# Patient Record
Sex: FEMALE | Race: WHITE | NOT HISPANIC OR LATINO | Employment: OTHER | URBAN - METROPOLITAN AREA
[De-identification: names, ages, dates, MRNs, and addresses within clinical notes are randomized per-mention and may not be internally consistent; named-entity substitution may affect disease eponyms.]

---

## 2024-08-14 ENCOUNTER — TRANSCRIBE ORDERS (OUTPATIENT)
Dept: ADMINISTRATIVE | Facility: HOSPITAL | Age: 74
End: 2024-08-14
Payer: MEDICARE

## 2024-08-14 DIAGNOSIS — R10.9 ABDOMINAL PAIN, UNSPECIFIED ABDOMINAL LOCATION: Primary | ICD-10-CM

## 2024-08-15 ENCOUNTER — HOSPITAL ENCOUNTER (OUTPATIENT)
Dept: CT IMAGING | Facility: HOSPITAL | Age: 74
Discharge: HOME | End: 2024-08-15
Admitting: FAMILY MEDICINE
Payer: MEDICARE

## 2024-08-15 DIAGNOSIS — R10.9 ABDOMINAL PAIN, UNSPECIFIED ABDOMINAL LOCATION: ICD-10-CM

## 2024-08-15 PROCEDURE — 74176 CT ABD & PELVIS W/O CONTRAST: CPT

## 2024-08-16 ENCOUNTER — TRANSCRIBE ORDERS (OUTPATIENT)
Dept: ADMINISTRATIVE | Facility: HOSPITAL | Age: 74
End: 2024-08-16
Payer: MEDICARE

## 2024-08-16 DIAGNOSIS — N28.9 RENAL LESION: Primary | ICD-10-CM

## 2024-08-16 DIAGNOSIS — R93.89 ABNORMAL CT SCAN: ICD-10-CM

## 2024-08-19 ENCOUNTER — TRANSCRIBE ORDERS (OUTPATIENT)
Dept: ADMINISTRATIVE | Facility: HOSPITAL | Age: 74
End: 2024-08-19
Payer: MEDICARE

## 2024-08-19 DIAGNOSIS — N28.9 RENAL LESION: Primary | ICD-10-CM

## 2024-08-22 ENCOUNTER — TRANSCRIBE ORDERS (OUTPATIENT)
Dept: ADMINISTRATIVE | Facility: HOSPITAL | Age: 74
End: 2024-08-22
Payer: MEDICARE

## 2024-08-22 DIAGNOSIS — R93.89 ABNORMAL CT SCAN: Primary | ICD-10-CM

## 2024-08-26 ENCOUNTER — OFFICE VISIT (OUTPATIENT)
Dept: CARDIOLOGY | Facility: CLINIC | Age: 74
End: 2024-08-26
Payer: MEDICARE

## 2024-08-26 VITALS
DIASTOLIC BLOOD PRESSURE: 72 MMHG | HEIGHT: 65 IN | SYSTOLIC BLOOD PRESSURE: 118 MMHG | BODY MASS INDEX: 27.56 KG/M2 | WEIGHT: 165.4 LBS | OXYGEN SATURATION: 98 % | HEART RATE: 57 BPM | RESPIRATION RATE: 17 BRPM

## 2024-08-26 DIAGNOSIS — R01.1 HEART MURMUR: Primary | ICD-10-CM

## 2024-08-26 PROCEDURE — 99204 OFFICE O/P NEW MOD 45 MIN: CPT | Performed by: INTERNAL MEDICINE

## 2024-08-26 RX ORDER — GLIMEPIRIDE 4 MG/1
1.5 TABLET ORAL 2 TIMES DAILY
COMMUNITY
Start: 2024-05-06

## 2024-08-26 RX ORDER — SEMAGLUTIDE 0.68 MG/ML
INJECTION, SOLUTION SUBCUTANEOUS
COMMUNITY
Start: 2024-08-20

## 2024-08-26 RX ORDER — CARVEDILOL 25 MG/1
25 TABLET ORAL EVERY 12 HOURS
COMMUNITY
Start: 2024-07-24

## 2024-08-26 RX ORDER — GABAPENTIN 400 MG/1
400 CAPSULE ORAL 4 TIMES DAILY
COMMUNITY
Start: 2024-05-06

## 2024-08-26 RX ORDER — ANASTROZOLE 1 MG/1
1 TABLET ORAL DAILY
COMMUNITY
Start: 2024-05-06

## 2024-08-26 RX ORDER — MECLIZINE HYDROCHLORIDE 25 MG/1
25 TABLET ORAL 3 TIMES DAILY PRN
COMMUNITY
Start: 2024-08-09

## 2024-08-26 RX ORDER — LOVASTATIN 20 MG
1 TABLET ORAL DAILY
COMMUNITY
Start: 2024-05-06

## 2024-08-26 RX ORDER — ASPIRIN 81 MG/81MG
1 CAPSULE ORAL DAILY
COMMUNITY

## 2024-08-26 RX ORDER — ALENDRONATE SODIUM 70 MG/1
TABLET ORAL
COMMUNITY
Start: 2024-05-06

## 2024-08-26 RX ORDER — HYDRALAZINE HYDROCHLORIDE 100 MG/1
1 TABLET, FILM COATED ORAL 2 TIMES DAILY
COMMUNITY
Start: 2024-08-09

## 2024-08-26 RX ORDER — HYDROCODONE BITARTRATE AND ACETAMINOPHEN 5; 325 MG/1; MG/1
1 TABLET ORAL EVERY 12 HOURS PRN
COMMUNITY
Start: 2024-08-09

## 2024-08-26 RX ORDER — LORAZEPAM 2 MG/1
2 TABLET ORAL
COMMUNITY

## 2024-08-26 RX ORDER — FAMOTIDINE 20 MG/1
1 TABLET, FILM COATED ORAL 2 TIMES DAILY
COMMUNITY
Start: 2024-05-06

## 2024-08-26 RX ORDER — SERTRALINE HYDROCHLORIDE 25 MG/1
25 TABLET, FILM COATED ORAL DAILY
COMMUNITY

## 2024-08-26 NOTE — PROGRESS NOTES
"     [facility] Cardiology OP Consult Note    Catrachito Schuler  [id number]  2024    Referred By: Keysha Guerra DO    Chief Complaint: Murmur    History of Present Illness:   Mrs. Catrachito Schuler is a 73 y.o. female who presents to the Cardiology Clinic for evaluation of a cardiac murmur.  The patient reports a past cardiac history for a \"leaky valve.\"  This was previously followed by cardiologist several years ago.  More recently, she underwent CT imaging of the abdomen, with an incidental finding of possible cardiomegaly.  She was subsequently referred to cardiology for further evaluation.  Currently, the patient does not have any specific cardiac complaints.  She denies significant exertional dyspnea.  No episodes of chest pain or chest discomfort.  No significant palpitations.  No other specific complaints    Past Cardiac Testin. Last Coronary Angio: None  2. Prior Stress Testing: None  3. Last Echo: Records unavailable  4. Prior Holter Monitor: None    Review of Systems:   Review of Systems   Constitutional:  Negative for activity change, appetite change, chills, diaphoresis, fatigue, fever, unexpected weight gain and unexpected weight loss.   Eyes:  Negative for blurred vision and double vision.   Respiratory:  Negative for cough, chest tightness, shortness of breath and wheezing.    Cardiovascular:  Negative for chest pain, palpitations and leg swelling.   Gastrointestinal:  Negative for abdominal pain, anal bleeding, blood in stool and GERD.   Endocrine: Negative for cold intolerance and heat intolerance.   Genitourinary:  Negative for hematuria.   Neurological:  Negative for dizziness, syncope, weakness and light-headedness.   Hematological:  Does not bruise/bleed easily.   Psychiatric/Behavioral:  Negative for depressed mood and stress. The patient is not nervous/anxious.        Past Medical History:   Past Medical History:   Diagnosis Date    Cancer     Diabetes " mellitus     GERD (gastroesophageal reflux disease)     Hyperlipidemia     Hypertension     Kidney disease     Murmur     Myocardial infarction        Past Surgical History:   Past Surgical History:   Procedure Laterality Date    HYSTERECTOMY      MASTECTOMY, PARTIAL         Family History: No family history on file.    Social History:   Social History     Socioeconomic History    Marital status:    Tobacco Use    Smoking status: Never    Smokeless tobacco: Never   Substance and Sexual Activity    Alcohol use: Never       Medications:     Current Outpatient Medications:     alendronate (FOSAMAX) 70 MG tablet, TAKE 1 TABLET BY MOUTH EVERY WEEK. TAKE WITH 6-8 OUNCES OF PLAIN WATER AT LEAST 30 MINUTES BEFORE FIRST FOOD, BEVERAGE, OR MEDICATION OF THE DAY, Disp: , Rfl:     anastrozole (ARIMIDEX) 1 MG tablet, Take 1 tablet by mouth Daily., Disp: , Rfl:     Aspirin ([place]) 81 MG capsule, Take 1 capsule by mouth Daily., Disp: , Rfl:     carvedilol (COREG) 25 MG tablet, Take 1 tablet by mouth Every 12 (Twelve) Hours., Disp: , Rfl:     famotidine (PEPCID) 20 MG tablet, Take 1 tablet by mouth 2 (Two) Times a Day., Disp: , Rfl:     gabapentin (NEURONTIN) 400 MG capsule, Take 1 capsule by mouth 4 (Four) Times a Day., Disp: , Rfl:     glimepiride (AMARYL) 4 MG tablet, Take 1.5 tablets by mouth 2 (Two) Times a Day., Disp: , Rfl:     hydrALAZINE (APRESOLINE) 100 MG tablet, Take 1 tablet by mouth 2 (Two) Times a Day., Disp: , Rfl:     HYDROcodone-acetaminophen (NORCO) 5-325 MG per tablet, Take 1 tablet by mouth Every 12 (Twelve) Hours As Needed., Disp: , Rfl:     LORazepam (ATIVAN) 2 MG tablet, Take 1 tablet by mouth every night at bedtime., Disp: , Rfl:     lovastatin (MEVACOR) 20 MG tablet, Take 1 tablet by mouth Daily., Disp: , Rfl:     meclizine (ANTIVERT) 25 MG tablet, Take 1 tablet by mouth 3 (Three) Times a Day As Needed., Disp: , Rfl:     metFORMIN (GLUCOPHAGE) 500 MG tablet, Take 2 tablets by mouth 2 (Two) Times  "a Day With Meals., Disp: , Rfl:     Ozempic, 0.25 or 0.5 MG/DOSE, 2 MG/3ML solution pen-injector, INJECT 0.25MG UNDER THE SKIN ONE TIME WEEKLY SAME DAY EACH WEEK, Disp: , Rfl:     sertraline (ZOLOFT) 25 MG tablet, Take 1 tablet by mouth Daily., Disp: , Rfl:     Allergies:   No Known Allergies    Physical Exam:  Vital Signs:   Vitals:    08/26/24 1348   BP: 118/72   BP Location: Right arm   Patient Position: Sitting   Cuff Size: Adult   Pulse: 57   Resp: 17   SpO2: 98%   Weight: 75 kg (165 lb 6.4 oz)   Height: 165.1 cm (65\")       Physical Exam  Constitutional:       General: She is not in acute distress.     Appearance: Normal appearance. She is well-developed. She is not diaphoretic.   HENT:      Head: Normocephalic and atraumatic.   Eyes:      General: No scleral icterus.     Pupils: Pupils are equal, round, and reactive to light.   Neck:      Trachea: No tracheal deviation.   Cardiovascular:      Rate and Rhythm: Normal rate and regular rhythm.      Heart sounds: Murmur heard.      No friction rub. No gallop.      Comments: Normal JVD.    Pulmonary:      Effort: Pulmonary effort is normal. No respiratory distress.      Breath sounds: Normal breath sounds. No stridor. No wheezing or rales.   Chest:      Chest wall: No tenderness.   Abdominal:      General: Bowel sounds are normal. There is no distension.      Palpations: Abdomen is soft.      Tenderness: There is no abdominal tenderness. There is no guarding or rebound.   Musculoskeletal:         General: No swelling. Normal range of motion.      Cervical back: Neck supple. No tenderness.   Lymphadenopathy:      Cervical: No cervical adenopathy.   Skin:     General: Skin is warm and dry.      Findings: No erythema.   Neurological:      General: No focal deficit present.      Mental Status: She is alert and oriented to person, place, and time.   Psychiatric:         Mood and Affect: Mood normal.         Behavior: Behavior normal.         Results Review:   I " reviewed the patient's new clinical results.        Assessment / Plan:     1.  Cardiac murmur  --Soft murmur on exam today, suspicious for aortic stenosis  -- Will obtain echocardiogram to further evaluate  -- Follow-up as needed, pending results of echocardiogram    2.  Possible cardiomegaly  -- Noted on recent CT imaging  -- Echocardiogram, as above          Follow Up:   Return if symptoms worsen or fail to improve.      Thank you for allowing me to participate in the care of your patient. Please to not hesitate to contact me with additional questions or concerns.     TONY Pham MD  Interventional Cardiology   08/26/2024  13:51 EDT

## 2024-09-04 ENCOUNTER — HOSPITAL ENCOUNTER (OUTPATIENT)
Facility: HOSPITAL | Age: 74
Discharge: HOME | End: 2024-09-04
Admitting: FAMILY MEDICINE
Payer: MEDICARE

## 2024-09-04 DIAGNOSIS — N28.9 RENAL LESION: ICD-10-CM

## 2024-09-04 PROCEDURE — 74181 MRI ABDOMEN W/O CONTRAST: CPT

## 2024-12-31 ENCOUNTER — APPOINTMENT (OUTPATIENT)
Dept: GENERAL RADIOLOGY | Facility: HOSPITAL | Age: 74
End: 2024-12-31
Payer: MEDICARE

## 2024-12-31 ENCOUNTER — HOSPITAL ENCOUNTER (OUTPATIENT)
Facility: HOSPITAL | Age: 74
Discharge: HOME | End: 2025-01-01
Attending: EMERGENCY MEDICINE | Admitting: INTERNAL MEDICINE
Payer: MEDICARE

## 2024-12-31 ENCOUNTER — APPOINTMENT (OUTPATIENT)
Dept: CT IMAGING | Facility: HOSPITAL | Age: 74
End: 2024-12-31
Payer: MEDICARE

## 2024-12-31 ENCOUNTER — APPOINTMENT (OUTPATIENT)
Dept: CARDIOLOGY | Facility: HOSPITAL | Age: 74
End: 2024-12-31
Payer: MEDICARE

## 2024-12-31 DIAGNOSIS — R07.9 CHEST PAIN, UNSPECIFIED TYPE: Primary | ICD-10-CM

## 2024-12-31 PROBLEM — R07.89 OTHER CHEST PAIN: Status: ACTIVE | Noted: 2024-12-31

## 2024-12-31 LAB
ALBUMIN SERPL-MCNC: 4.4 G/DL (ref 3.5–5.2)
ALBUMIN/GLOB SERPL: 1.3 G/DL
ALP SERPL-CCNC: 98 U/L (ref 39–117)
ALT SERPL W P-5'-P-CCNC: 17 U/L (ref 1–33)
ANION GAP SERPL CALCULATED.3IONS-SCNC: 10 MMOL/L (ref 5–15)
AST SERPL-CCNC: 22 U/L (ref 1–32)
AV MEAN PRESS GRAD SYS DOP V1V2: 16.5 MMHG
AV VMAX SYS DOP: 296 CM/SEC
BASOPHILS # BLD AUTO: 0.04 10*3/MM3 (ref 0–0.2)
BASOPHILS NFR BLD AUTO: 1.4 % (ref 0–1.5)
BH CV ECHO MEAS - AO MAX PG: 35.1 MMHG
BH CV ECHO MEAS - AO ROOT DIAM: 3 CM
BH CV ECHO MEAS - AO V2 VTI: 60 CM
BH CV ECHO MEAS - AVA(I,D): 1.34 CM2
BH CV ECHO MEAS - EDV(CUBED): 84.6 ML
BH CV ECHO MEAS - EDV(MOD-SP2): 80.7 ML
BH CV ECHO MEAS - EDV(MOD-SP4): 113 ML
BH CV ECHO MEAS - EF(MOD-SP2): 55.6 %
BH CV ECHO MEAS - EF(MOD-SP4): 68.2 %
BH CV ECHO MEAS - ESV(CUBED): 27.8 ML
BH CV ECHO MEAS - ESV(MOD-SP2): 35.8 ML
BH CV ECHO MEAS - ESV(MOD-SP4): 35.9 ML
BH CV ECHO MEAS - FS: 31 %
BH CV ECHO MEAS - IVS/LVPW: 1.12 CM
BH CV ECHO MEAS - IVSD: 1.27 CM
BH CV ECHO MEAS - LA DIMENSION: 5.1 CM
BH CV ECHO MEAS - LAT PEAK E' VEL: 5.7 CM/SEC
BH CV ECHO MEAS - LV DIASTOLIC VOL/BSA (35-75): 63.9 CM2
BH CV ECHO MEAS - LV MASS(C)D: 190.7 GRAMS
BH CV ECHO MEAS - LV MAX PG: 7.3 MMHG
BH CV ECHO MEAS - LV MEAN PG: 3 MMHG
BH CV ECHO MEAS - LV SYSTOLIC VOL/BSA (12-30): 20.3 CM2
BH CV ECHO MEAS - LV V1 MAX: 135 CM/SEC
BH CV ECHO MEAS - LV V1 VTI: 27.4 CM
BH CV ECHO MEAS - LVIDD: 4.4 CM
BH CV ECHO MEAS - LVIDS: 3 CM
BH CV ECHO MEAS - LVOT AREA: 2.9 CM2
BH CV ECHO MEAS - LVOT DIAM: 1.93 CM
BH CV ECHO MEAS - LVPWD: 1.13 CM
BH CV ECHO MEAS - MED PEAK E' VEL: 3.8 CM/SEC
BH CV ECHO MEAS - MV A MAX VEL: 105 CM/SEC
BH CV ECHO MEAS - MV DEC SLOPE: 235 CM/SEC2
BH CV ECHO MEAS - MV DEC TIME: 0.27 SEC
BH CV ECHO MEAS - MV E MAX VEL: 62.7 CM/SEC
BH CV ECHO MEAS - MV E/A: 0.6
BH CV ECHO MEAS - MV MAX PG: 5 MMHG
BH CV ECHO MEAS - MV MEAN PG: 2 MMHG
BH CV ECHO MEAS - MV V2 VTI: 31 CM
BH CV ECHO MEAS - MVA(VTI): 2.6 CM2
BH CV ECHO MEAS - PA ACC TIME: 0.08 SEC
BH CV ECHO MEAS - PA V2 MAX: 120 CM/SEC
BH CV ECHO MEAS - RAP SYSTOLE: 3 MMHG
BH CV ECHO MEAS - RV MAX PG: 4.2 MMHG
BH CV ECHO MEAS - RV V1 MAX: 103 CM/SEC
BH CV ECHO MEAS - RV V1 VTI: 14.2 CM
BH CV ECHO MEAS - SV(LVOT): 80.2 ML
BH CV ECHO MEAS - SV(MOD-SP2): 44.9 ML
BH CV ECHO MEAS - SV(MOD-SP4): 77.1 ML
BH CV ECHO MEAS - SVI(LVOT): 45.3 ML/M2
BH CV ECHO MEAS - SVI(MOD-SP2): 25.4 ML/M2
BH CV ECHO MEAS - SVI(MOD-SP4): 43.6 ML/M2
BH CV ECHO MEAS - TAPSE (>1.6): 2.7 CM
BH CV ECHO MEASUREMENTS AVERAGE E/E' RATIO: 13.2
BH CV XLRA - RV BASE: 3.5 CM
BH CV XLRA - RV LENGTH: 7.9 CM
BH CV XLRA - RV MID: 2.7 CM
BH CV XLRA - TDI S': 12.2 CM/SEC
BILIRUB SERPL-MCNC: 0.2 MG/DL (ref 0–1.2)
BUN SERPL-MCNC: 21 MG/DL (ref 8–23)
BUN/CREAT SERPL: 19.4 (ref 7–25)
CALCIUM SPEC-SCNC: 9.9 MG/DL (ref 8.6–10.5)
CHLORIDE SERPL-SCNC: 104 MMOL/L (ref 98–107)
CO2 SERPL-SCNC: 25 MMOL/L (ref 22–29)
CREAT SERPL-MCNC: 1.08 MG/DL (ref 0.57–1)
DEPRECATED RDW RBC AUTO: 48.3 FL (ref 37–54)
EGFRCR SERPLBLD CKD-EPI 2021: 53.3 ML/MIN/1.73
EOSINOPHIL # BLD AUTO: 0.22 10*3/MM3 (ref 0–0.4)
EOSINOPHIL NFR BLD AUTO: 7.5 % (ref 0.3–6.2)
ERYTHROCYTE [DISTWIDTH] IN BLOOD BY AUTOMATED COUNT: 14.1 % (ref 12.3–15.4)
GEN 5 1HR TROPONIN T REFLEX: 16 NG/L
GLOBULIN UR ELPH-MCNC: 3.4 GM/DL
GLUCOSE BLDC GLUCOMTR-MCNC: 117 MG/DL (ref 70–130)
GLUCOSE BLDC GLUCOMTR-MCNC: 148 MG/DL (ref 70–130)
GLUCOSE SERPL-MCNC: 197 MG/DL (ref 65–99)
HCT VFR BLD AUTO: 37.5 % (ref 34–46.6)
HGB BLD-MCNC: 12.4 G/DL (ref 12–15.9)
HOLD SPECIMEN: NORMAL
HOLD SPECIMEN: NORMAL
IMM GRANULOCYTES # BLD AUTO: 0.04 10*3/MM3 (ref 0–0.05)
IMM GRANULOCYTES NFR BLD AUTO: 1.4 % (ref 0–0.5)
LEFT ATRIUM VOLUME INDEX: 34.8 ML/M2
LV EF BIPLANE MOD: 63.5 %
LYMPHOCYTES # BLD AUTO: 1.16 10*3/MM3 (ref 0.7–3.1)
LYMPHOCYTES NFR BLD AUTO: 39.7 % (ref 19.6–45.3)
MCH RBC QN AUTO: 30.6 PG (ref 26.6–33)
MCHC RBC AUTO-ENTMCNC: 33.1 G/DL (ref 31.5–35.7)
MCV RBC AUTO: 92.6 FL (ref 79–97)
MONOCYTES # BLD AUTO: 0.26 10*3/MM3 (ref 0.1–0.9)
MONOCYTES NFR BLD AUTO: 8.9 % (ref 5–12)
NEUTROPHILS NFR BLD AUTO: 1.2 10*3/MM3 (ref 1.7–7)
NEUTROPHILS NFR BLD AUTO: 41.1 % (ref 42.7–76)
NRBC BLD AUTO-RTO: 0 /100 WBC (ref 0–0.2)
PLATELET # BLD AUTO: 203 10*3/MM3 (ref 140–450)
PMV BLD AUTO: 10.3 FL (ref 6–12)
POTASSIUM SERPL-SCNC: 4.1 MMOL/L (ref 3.5–5.2)
PROCALCITONIN SERPL-MCNC: 0.04 NG/ML (ref 0–0.25)
PROT SERPL-MCNC: 7.8 G/DL (ref 6–8.5)
RBC # BLD AUTO: 4.05 10*6/MM3 (ref 3.77–5.28)
SODIUM SERPL-SCNC: 139 MMOL/L (ref 136–145)
TROPONIN T % DELTA: 0 %
TROPONIN T NUMERIC DELTA: 0 NG/L
TROPONIN T SERPL HS-MCNC: 16 NG/L
WBC NRBC COR # BLD AUTO: 2.92 10*3/MM3 (ref 3.4–10.8)
WHOLE BLOOD HOLD COAG: NORMAL
WHOLE BLOOD HOLD SPECIMEN: NORMAL

## 2024-12-31 PROCEDURE — A9270 NON-COVERED ITEM OR SERVICE: HCPCS | Performed by: INTERNAL MEDICINE

## 2024-12-31 PROCEDURE — 71275 CT ANGIOGRAPHY CHEST: CPT

## 2024-12-31 PROCEDURE — 85025 COMPLETE CBC W/AUTO DIFF WBC: CPT | Performed by: EMERGENCY MEDICINE

## 2024-12-31 PROCEDURE — 25810000003 SODIUM CHLORIDE 0.9 % SOLUTION: Performed by: NURSE PRACTITIONER

## 2024-12-31 PROCEDURE — 25510000001 IOPAMIDOL PER 1 ML: Performed by: INTERNAL MEDICINE

## 2024-12-31 PROCEDURE — 63710000001 GABAPENTIN 400 MG CAPSULE: Performed by: INTERNAL MEDICINE

## 2024-12-31 PROCEDURE — 25010000002 HEPARIN (PORCINE) PER 1000 UNITS: Performed by: INTERNAL MEDICINE

## 2024-12-31 PROCEDURE — 63710000001 AMLODIPINE 5 MG TABLET: Performed by: INTERNAL MEDICINE

## 2024-12-31 PROCEDURE — C1769 GUIDE WIRE: HCPCS | Performed by: INTERNAL MEDICINE

## 2024-12-31 PROCEDURE — 25010000002 FENTANYL CITRATE (PF) 50 MCG/ML SOLUTION: Performed by: INTERNAL MEDICINE

## 2024-12-31 PROCEDURE — G0378 HOSPITAL OBSERVATION PER HR: HCPCS

## 2024-12-31 PROCEDURE — 63710000001 FAMOTIDINE 20 MG TABLET: Performed by: INTERNAL MEDICINE

## 2024-12-31 PROCEDURE — 84145 PROCALCITONIN (PCT): CPT | Performed by: NURSE PRACTITIONER

## 2024-12-31 PROCEDURE — 93306 TTE W/DOPPLER COMPLETE: CPT | Performed by: INTERNAL MEDICINE

## 2024-12-31 PROCEDURE — 93454 CORONARY ARTERY ANGIO S&I: CPT | Performed by: INTERNAL MEDICINE

## 2024-12-31 PROCEDURE — 63710000001 NITROGLYCERIN 0.4 MG SUBLINGUAL TABLET: Performed by: INTERNAL MEDICINE

## 2024-12-31 PROCEDURE — C1894 INTRO/SHEATH, NON-LASER: HCPCS | Performed by: INTERNAL MEDICINE

## 2024-12-31 PROCEDURE — 99285 EMERGENCY DEPT VISIT HI MDM: CPT | Performed by: EMERGENCY MEDICINE

## 2024-12-31 PROCEDURE — 84484 ASSAY OF TROPONIN QUANT: CPT | Performed by: EMERGENCY MEDICINE

## 2024-12-31 PROCEDURE — C1760 CLOSURE DEV, VASC: HCPCS | Performed by: INTERNAL MEDICINE

## 2024-12-31 PROCEDURE — 93005 ELECTROCARDIOGRAM TRACING: CPT | Performed by: EMERGENCY MEDICINE

## 2024-12-31 PROCEDURE — 99204 OFFICE O/P NEW MOD 45 MIN: CPT | Performed by: INTERNAL MEDICINE

## 2024-12-31 PROCEDURE — 71045 X-RAY EXAM CHEST 1 VIEW: CPT

## 2024-12-31 PROCEDURE — 25510000001 IOPAMIDOL 61 % SOLUTION: Performed by: EMERGENCY MEDICINE

## 2024-12-31 PROCEDURE — 63710000001 SACUBITRIL-VALSARTAN 24-26 MG TABLET: Performed by: INTERNAL MEDICINE

## 2024-12-31 PROCEDURE — 25010000002 MIDAZOLAM PER 1MG: Performed by: INTERNAL MEDICINE

## 2024-12-31 PROCEDURE — 63710000001 ONDANSETRON ODT 4 MG TABLET DISPERSIBLE: Performed by: INTERNAL MEDICINE

## 2024-12-31 PROCEDURE — 25010000002 HYDRALAZINE PER 20 MG: Performed by: INTERNAL MEDICINE

## 2024-12-31 PROCEDURE — 25010000002 ONDANSETRON PER 1 MG: Performed by: EMERGENCY MEDICINE

## 2024-12-31 PROCEDURE — 63710000001 ATORVASTATIN 80 MG TABLET: Performed by: INTERNAL MEDICINE

## 2024-12-31 PROCEDURE — 80053 COMPREHEN METABOLIC PANEL: CPT | Performed by: EMERGENCY MEDICINE

## 2024-12-31 PROCEDURE — 99222 1ST HOSP IP/OBS MODERATE 55: CPT | Performed by: NURSE PRACTITIONER

## 2024-12-31 PROCEDURE — 63710000001 CARVEDILOL 25 MG TABLET: Performed by: INTERNAL MEDICINE

## 2024-12-31 PROCEDURE — 96374 THER/PROPH/DIAG INJ IV PUSH: CPT

## 2024-12-31 PROCEDURE — 25010000002 LIDOCAINE 1 % SOLUTION: Performed by: INTERNAL MEDICINE

## 2024-12-31 PROCEDURE — 93306 TTE W/DOPPLER COMPLETE: CPT

## 2024-12-31 PROCEDURE — 82948 REAGENT STRIP/BLOOD GLUCOSE: CPT

## 2024-12-31 RX ORDER — BISACODYL 10 MG
10 SUPPOSITORY, RECTAL RECTAL DAILY PRN
Status: DISCONTINUED | OUTPATIENT
Start: 2024-12-31 | End: 2025-01-01 | Stop reason: HOSPADM

## 2024-12-31 RX ORDER — HEPARIN SODIUM 5000 [USP'U]/ML
5000 INJECTION, SOLUTION INTRAVENOUS; SUBCUTANEOUS EVERY 12 HOURS SCHEDULED
Status: DISCONTINUED | OUTPATIENT
Start: 2024-12-31 | End: 2025-01-01 | Stop reason: HOSPADM

## 2024-12-31 RX ORDER — GABAPENTIN 400 MG/1
400 CAPSULE ORAL 4 TIMES DAILY
Status: DISCONTINUED | OUTPATIENT
Start: 2024-12-31 | End: 2025-01-01 | Stop reason: HOSPADM

## 2024-12-31 RX ORDER — AMLODIPINE BESYLATE 5 MG/1
10 TABLET ORAL DAILY
Status: DISCONTINUED | OUTPATIENT
Start: 2024-12-31 | End: 2025-01-01 | Stop reason: HOSPADM

## 2024-12-31 RX ORDER — LORAZEPAM 2 MG/1
2 TABLET ORAL EVERY 6 HOURS PRN
Status: DISCONTINUED | OUTPATIENT
Start: 2024-12-31 | End: 2025-01-01 | Stop reason: HOSPADM

## 2024-12-31 RX ORDER — HYDROCODONE BITARTRATE AND ACETAMINOPHEN 5; 325 MG/1; MG/1
1 TABLET ORAL EVERY 4 HOURS PRN
Status: DISCONTINUED | OUTPATIENT
Start: 2024-12-31 | End: 2025-01-01 | Stop reason: HOSPADM

## 2024-12-31 RX ORDER — MECLIZINE HYDROCHLORIDE 25 MG/1
25 TABLET ORAL 3 TIMES DAILY PRN
Status: DISCONTINUED | OUTPATIENT
Start: 2024-12-31 | End: 2025-01-01 | Stop reason: HOSPADM

## 2024-12-31 RX ORDER — NITROGLYCERIN 0.4 MG/1
0.4 TABLET SUBLINGUAL
Status: DISCONTINUED | OUTPATIENT
Start: 2024-12-31 | End: 2025-01-01 | Stop reason: HOSPADM

## 2024-12-31 RX ORDER — FAMOTIDINE 20 MG/1
20 TABLET, FILM COATED ORAL 2 TIMES DAILY
Status: DISCONTINUED | OUTPATIENT
Start: 2024-12-31 | End: 2024-12-31

## 2024-12-31 RX ORDER — BISACODYL 5 MG/1
5 TABLET, DELAYED RELEASE ORAL DAILY PRN
Status: DISCONTINUED | OUTPATIENT
Start: 2024-12-31 | End: 2025-01-01 | Stop reason: HOSPADM

## 2024-12-31 RX ORDER — FENTANYL CITRATE 50 UG/ML
INJECTION, SOLUTION INTRAMUSCULAR; INTRAVENOUS
Status: DISCONTINUED | OUTPATIENT
Start: 2024-12-31 | End: 2024-12-31 | Stop reason: HOSPADM

## 2024-12-31 RX ORDER — MIDAZOLAM HYDROCHLORIDE 2 MG/2ML
INJECTION, SOLUTION INTRAMUSCULAR; INTRAVENOUS
Status: DISCONTINUED | OUTPATIENT
Start: 2024-12-31 | End: 2024-12-31 | Stop reason: HOSPADM

## 2024-12-31 RX ORDER — ONDANSETRON 4 MG/1
4 TABLET, ORALLY DISINTEGRATING ORAL EVERY 6 HOURS PRN
Status: DISCONTINUED | OUTPATIENT
Start: 2024-12-31 | End: 2025-01-01 | Stop reason: HOSPADM

## 2024-12-31 RX ORDER — DIPHENHYDRAMINE HYDROCHLORIDE 50 MG/ML
25 INJECTION INTRAMUSCULAR; INTRAVENOUS EVERY 6 HOURS PRN
Status: DISCONTINUED | OUTPATIENT
Start: 2024-12-31 | End: 2025-01-01 | Stop reason: HOSPADM

## 2024-12-31 RX ORDER — CARVEDILOL 25 MG/1
25 TABLET ORAL EVERY 12 HOURS SCHEDULED
Status: DISCONTINUED | OUTPATIENT
Start: 2024-12-31 | End: 2025-01-01

## 2024-12-31 RX ORDER — VERAPAMIL HYDROCHLORIDE 2.5 MG/ML
INJECTION, SOLUTION INTRAVENOUS
Status: DISCONTINUED | OUTPATIENT
Start: 2024-12-31 | End: 2024-12-31 | Stop reason: HOSPADM

## 2024-12-31 RX ORDER — SODIUM CHLORIDE 0.9 % (FLUSH) 0.9 %
10 SYRINGE (ML) INJECTION AS NEEDED
Status: DISCONTINUED | OUTPATIENT
Start: 2024-12-31 | End: 2025-01-01 | Stop reason: HOSPADM

## 2024-12-31 RX ORDER — TEMAZEPAM 15 MG/1
15 CAPSULE ORAL NIGHTLY PRN
Status: DISCONTINUED | OUTPATIENT
Start: 2024-12-31 | End: 2025-01-01 | Stop reason: HOSPADM

## 2024-12-31 RX ORDER — POLYETHYLENE GLYCOL 3350 17 G/17G
17 POWDER, FOR SOLUTION ORAL DAILY PRN
Status: DISCONTINUED | OUTPATIENT
Start: 2024-12-31 | End: 2025-01-01 | Stop reason: HOSPADM

## 2024-12-31 RX ORDER — INSULIN LISPRO 100 [IU]/ML
2-9 INJECTION, SOLUTION INTRAVENOUS; SUBCUTANEOUS
Status: DISCONTINUED | OUTPATIENT
Start: 2024-12-31 | End: 2025-01-01 | Stop reason: HOSPADM

## 2024-12-31 RX ORDER — SODIUM CHLORIDE 9 MG/ML
40 INJECTION, SOLUTION INTRAVENOUS AS NEEDED
Status: DISCONTINUED | OUTPATIENT
Start: 2024-12-31 | End: 2025-01-01 | Stop reason: HOSPADM

## 2024-12-31 RX ORDER — ASPIRIN 81 MG/1
81 TABLET ORAL DAILY
Status: DISCONTINUED | OUTPATIENT
Start: 2025-01-01 | End: 2025-01-01 | Stop reason: HOSPADM

## 2024-12-31 RX ORDER — FAMOTIDINE 20 MG/1
20 TABLET, FILM COATED ORAL 2 TIMES DAILY
Status: DISCONTINUED | OUTPATIENT
Start: 2024-12-31 | End: 2025-01-01 | Stop reason: HOSPADM

## 2024-12-31 RX ORDER — AMOXICILLIN 250 MG
2 CAPSULE ORAL 2 TIMES DAILY PRN
Status: DISCONTINUED | OUTPATIENT
Start: 2024-12-31 | End: 2025-01-01 | Stop reason: HOSPADM

## 2024-12-31 RX ORDER — ONDANSETRON 2 MG/ML
4 INJECTION INTRAMUSCULAR; INTRAVENOUS ONCE
Status: COMPLETED | OUTPATIENT
Start: 2024-12-31 | End: 2024-12-31

## 2024-12-31 RX ORDER — SODIUM CHLORIDE 9 MG/ML
100 INJECTION, SOLUTION INTRAVENOUS CONTINUOUS
Status: DISCONTINUED | OUTPATIENT
Start: 2024-12-31 | End: 2024-12-31

## 2024-12-31 RX ORDER — ACETAMINOPHEN 325 MG/1
650 TABLET ORAL EVERY 4 HOURS PRN
Status: DISCONTINUED | OUTPATIENT
Start: 2024-12-31 | End: 2025-01-01 | Stop reason: HOSPADM

## 2024-12-31 RX ORDER — ATORVASTATIN CALCIUM 80 MG/1
80 TABLET, FILM COATED ORAL NIGHTLY
Status: DISCONTINUED | OUTPATIENT
Start: 2024-12-31 | End: 2025-01-01 | Stop reason: HOSPADM

## 2024-12-31 RX ORDER — NALOXONE HCL 0.4 MG/ML
0.4 VIAL (ML) INJECTION
Status: DISCONTINUED | OUTPATIENT
Start: 2024-12-31 | End: 2025-01-01 | Stop reason: HOSPADM

## 2024-12-31 RX ORDER — DEXTROSE MONOHYDRATE 25 G/50ML
25 INJECTION, SOLUTION INTRAVENOUS
Status: DISCONTINUED | OUTPATIENT
Start: 2024-12-31 | End: 2025-01-01 | Stop reason: HOSPADM

## 2024-12-31 RX ORDER — LIDOCAINE HYDROCHLORIDE 10 MG/ML
INJECTION, SOLUTION INFILTRATION; PERINEURAL
Status: DISCONTINUED | OUTPATIENT
Start: 2024-12-31 | End: 2024-12-31 | Stop reason: HOSPADM

## 2024-12-31 RX ORDER — MORPHINE SULFATE 2 MG/ML
4 INJECTION, SOLUTION INTRAMUSCULAR; INTRAVENOUS
Status: DISCONTINUED | OUTPATIENT
Start: 2024-12-31 | End: 2025-01-01 | Stop reason: HOSPADM

## 2024-12-31 RX ORDER — IOPAMIDOL 612 MG/ML
100 INJECTION, SOLUTION INTRAVASCULAR
Status: COMPLETED | OUTPATIENT
Start: 2024-12-31 | End: 2024-12-31

## 2024-12-31 RX ORDER — HYDROCODONE BITARTRATE AND ACETAMINOPHEN 5; 325 MG/1; MG/1
1 TABLET ORAL EVERY 12 HOURS PRN
Status: DISCONTINUED | OUTPATIENT
Start: 2024-12-31 | End: 2025-01-01 | Stop reason: HOSPADM

## 2024-12-31 RX ORDER — ASPIRIN 81 MG/1
324 TABLET, CHEWABLE ORAL ONCE
Status: COMPLETED | OUTPATIENT
Start: 2024-12-31 | End: 2024-12-31

## 2024-12-31 RX ORDER — NITROGLYCERIN 0.4 MG/1
0.4 TABLET SUBLINGUAL
Status: DISCONTINUED | OUTPATIENT
Start: 2024-12-31 | End: 2024-12-31

## 2024-12-31 RX ORDER — SODIUM CHLORIDE 9 MG/ML
100 INJECTION, SOLUTION INTRAVENOUS CONTINUOUS
Status: ACTIVE | OUTPATIENT
Start: 2024-12-31 | End: 2024-12-31

## 2024-12-31 RX ORDER — SODIUM CHLORIDE 0.9 % (FLUSH) 0.9 %
10 SYRINGE (ML) INJECTION EVERY 12 HOURS SCHEDULED
Status: DISCONTINUED | OUTPATIENT
Start: 2024-12-31 | End: 2025-01-01 | Stop reason: HOSPADM

## 2024-12-31 RX ORDER — HYDRALAZINE HYDROCHLORIDE 20 MG/ML
INJECTION INTRAMUSCULAR; INTRAVENOUS
Status: DISCONTINUED | OUTPATIENT
Start: 2024-12-31 | End: 2024-12-31 | Stop reason: HOSPADM

## 2024-12-31 RX ORDER — ONDANSETRON 2 MG/ML
4 INJECTION INTRAMUSCULAR; INTRAVENOUS EVERY 6 HOURS PRN
Status: DISCONTINUED | OUTPATIENT
Start: 2024-12-31 | End: 2024-12-31 | Stop reason: SDUPTHER

## 2024-12-31 RX ORDER — HYDRALAZINE HYDROCHLORIDE 25 MG/1
100 TABLET, FILM COATED ORAL 2 TIMES DAILY
Status: DISCONTINUED | OUTPATIENT
Start: 2024-12-31 | End: 2025-01-01

## 2024-12-31 RX ORDER — ONDANSETRON 2 MG/ML
4 INJECTION INTRAMUSCULAR; INTRAVENOUS EVERY 6 HOURS PRN
Status: DISCONTINUED | OUTPATIENT
Start: 2024-12-31 | End: 2025-01-01 | Stop reason: HOSPADM

## 2024-12-31 RX ORDER — HEPARIN SODIUM 1000 [USP'U]/ML
INJECTION, SOLUTION INTRAVENOUS; SUBCUTANEOUS
Status: DISCONTINUED | OUTPATIENT
Start: 2024-12-31 | End: 2024-12-31 | Stop reason: HOSPADM

## 2024-12-31 RX ORDER — NICOTINE POLACRILEX 4 MG
15 LOZENGE BUCCAL
Status: DISCONTINUED | OUTPATIENT
Start: 2024-12-31 | End: 2025-01-01 | Stop reason: HOSPADM

## 2024-12-31 RX ORDER — IOPAMIDOL 755 MG/ML
INJECTION, SOLUTION INTRAVASCULAR
Status: DISCONTINUED | OUTPATIENT
Start: 2024-12-31 | End: 2024-12-31 | Stop reason: HOSPADM

## 2024-12-31 RX ADMIN — GABAPENTIN 400 MG: 400 CAPSULE ORAL at 20:52

## 2024-12-31 RX ADMIN — GABAPENTIN 400 MG: 400 CAPSULE ORAL at 12:54

## 2024-12-31 RX ADMIN — IOPAMIDOL 100 ML: 612 INJECTION, SOLUTION INTRAVENOUS at 05:14

## 2024-12-31 RX ADMIN — Medication 10 ML: at 12:29

## 2024-12-31 RX ADMIN — NITROGLYCERIN 0.4 MG: 0.4 TABLET SUBLINGUAL at 05:20

## 2024-12-31 RX ADMIN — ATORVASTATIN CALCIUM 80 MG: 80 TABLET, FILM COATED ORAL at 20:52

## 2024-12-31 RX ADMIN — ASPIRIN 81 MG CHEWABLE TABLET 324 MG: 81 TABLET CHEWABLE at 06:29

## 2024-12-31 RX ADMIN — SODIUM CHLORIDE 100 ML/HR: 9 INJECTION, SOLUTION INTRAVENOUS at 12:27

## 2024-12-31 RX ADMIN — ONDANSETRON 4 MG: 2 INJECTION INTRAMUSCULAR; INTRAVENOUS at 05:22

## 2024-12-31 RX ADMIN — CARVEDILOL 25 MG: 25 TABLET, FILM COATED ORAL at 12:54

## 2024-12-31 RX ADMIN — FAMOTIDINE 20 MG: 20 TABLET, FILM COATED ORAL at 12:54

## 2024-12-31 RX ADMIN — ALUMINUM HYDROXIDE, MAGNESIUM HYDROXIDE, AND DIMETHICONE: 400; 400; 40 SUSPENSION ORAL at 05:22

## 2024-12-31 RX ADMIN — AMLODIPINE BESYLATE 10 MG: 5 TABLET ORAL at 12:54

## 2024-12-31 RX ADMIN — NITROGLYCERIN 0.4 MG: 0.4 TABLET SUBLINGUAL at 06:31

## 2024-12-31 RX ADMIN — FAMOTIDINE 20 MG: 20 TABLET, FILM COATED ORAL at 20:52

## 2024-12-31 RX ADMIN — SACUBITRIL AND VALSARTAN 1 TABLET: 24; 26 TABLET, FILM COATED ORAL at 12:54

## 2024-12-31 RX ADMIN — NITROGLYCERIN 0.4 MG: 0.4 TABLET SUBLINGUAL at 10:39

## 2024-12-31 RX ADMIN — ONDANSETRON 4 MG: 4 TABLET, ORALLY DISINTEGRATING ORAL at 12:27

## 2024-12-31 NOTE — PAYOR COMM NOTE
"TO:HUMANA  FROM:SHENG CASTELLON RN PHONE [phone]  OBSERVATION NOTIFICATION  TAX ID [id number] MÓNICA [id number]    Edmundo Reed (74 y.o. Female)       Date of Birth   1950    Social Security Number       Address   [facility] [address]    Home Phone   [phone]    MRN   [id number]       Bahai   None    Marital Status                               Admission Date   24    Admission Type   Emergency    Admitting Provider   Jessee Ash DO    Attending Provider   Jessee Ash DO    Department, Room/Bed   [facility] TELEMETRY 3, 308/1       Discharge Date       Discharge Disposition       Discharge Destination                                 Attending Provider: Jessee Ash DO    Allergies: No Known Allergies    Isolation: None   Infection: None   Code Status: CPR    Ht: 160 cm (63\")   Wt: 79.9 kg (176 lb 2.4 oz)    Admission Cmt: None   Principal Problem: Chest pain [R07.9]                   Active Insurance as of 2024       Primary Coverage       Payor Plan Insurance Group Employer/Plan Group    HUMANA MEDICARE REPLACEMENT HUMANA MED ADV HMO [id number]       Payor Plan Address Payor Plan Phone Number Payor Plan Fax Number Effective Dates    [address] [address] [phone]  2024 - None Entered    [facility] [address]         Subscriber Name Subscriber Birth Date Member ID       EDMUNDO REED 1950 [id number]                     Emergency Contacts        (Rel.) Home Phone Work Phone Mobile Phone    NewsomeMaira pantoja (Daughter) [phone] -- [phone]    Lowell Reed (Spouse) [phone] -- [phone]                 History & Physical        Alhaji, FREDDY Smith at 24 0722            [facility]   HISTORY AND PHYSICAL      Name:  Edmundo Reed   Age:  74 y.o.  Sex:  female  :  1950  MRN:  [id number]   Visit Number:  [id number]  Admission " Date:  12/31/2024  Date Of Service:  12/31/24  Primary Care Physician:  Keysha Guerra DO    Chief Complaint:     Chest pain    History Of Presenting Illness:      Ms. Schuler is a 74-year-old female with pertinent past medical history of chronic kidney disease, diabetes mellitus type 2, chronic kidney disease, hypertension who presented to emergency department due to severe chest pain that began at 3 AM, awakening patient with associated nausea.  Patient currently lives with her .  Per daughter at bedside blood pressure has been very uncontrolled over the past couple of weeks with systolic in the 200s.  Patient has also had a cough over the past several days.  Denied fever or shortness of air.    Upon ED presentation patient blood pressure 226/113.  Pertinent labs and imaging noted procalcitonin 0.04, troponin 16 with repeat 16, creatinine 1.08, CTA of chest noted no PE evident with minimal patchy particularly left lower lobe groundglass densities may be incidental but cannot exclude minimal atypical pneumonia.  Hospitalist consulted for further medical management.  Cardiology consulted, patient taken to cardiac catheterization.  Fortunately patient noted to have angiographically normal coronary arteries.  Patient continued to be monitored on telemetry with home medications restarted.      Review Of Systems:    All systems were reviewed and negative except as mentioned in history of presenting illness, assessment and plan.    Past Medical History: Patient  has a past medical history of Cancer, Diabetes mellitus, GERD (gastroesophageal reflux disease), Hyperlipidemia, Hypertension, Kidney disease, Murmur, and Myocardial infarction.    Past Surgical History: Patient  has a past surgical history that includes Hysterectomy and Mastectomy, partial.    Social History: Patient  reports that she has never smoked. She has never used smokeless tobacco. She reports that she does not drink alcohol and does not use  drugs.    Family History:  Patient's family history has been reviewed and found to be noncontributory.     Allergies:      Patient has no known allergies.    Home Medications:    Prior to Admission Medications       Prescriptions Last Dose Informant Patient Reported? Taking?    alendronate (FOSAMAX) 70 MG tablet   Yes No    TAKE 1 TABLET BY MOUTH EVERY WEEK. TAKE WITH 6-8 OUNCES OF PLAIN WATER AT LEAST 30 MINUTES BEFORE FIRST FOOD, BEVERAGE, OR MEDICATION OF THE DAY    anastrozole (ARIMIDEX) 1 MG tablet   Yes No    Take 1 tablet by mouth Daily.    Aspirin ([place]) 81 MG capsule   Yes No    Take 1 capsule by mouth Daily.    carvedilol (COREG) 25 MG tablet   Yes No    Take 1 tablet by mouth Every 12 (Twelve) Hours.    famotidine (PEPCID) 20 MG tablet   Yes No    Take 1 tablet by mouth 2 (Two) Times a Day.    gabapentin (NEURONTIN) 400 MG capsule   Yes No    Take 1 capsule by mouth 4 (Four) Times a Day.    glimepiride (AMARYL) 4 MG tablet   Yes No    Take 1.5 tablets by mouth 2 (Two) Times a Day.    hydrALAZINE (APRESOLINE) 100 MG tablet   Yes No    Take 1 tablet by mouth 2 (Two) Times a Day.    HYDROcodone-acetaminophen (NORCO) 5-325 MG per tablet   Yes No    Take 1 tablet by mouth Every 12 (Twelve) Hours As Needed.    LORazepam (ATIVAN) 2 MG tablet   Yes No    Take 1 tablet by mouth every night at bedtime.    lovastatin (MEVACOR) 20 MG tablet   Yes No    Take 1 tablet by mouth Daily.    meclizine (ANTIVERT) 25 MG tablet   Yes No    Take 1 tablet by mouth 3 (Three) Times a Day As Needed.    metFORMIN (GLUCOPHAGE) 500 MG tablet   Yes No    Take 2 tablets by mouth 2 (Two) Times a Day With Meals.    Ozempic, 0.25 or 0.5 MG/DOSE, 2 MG/3ML solution pen-injector   Yes No    INJECT 0.25MG UNDER THE SKIN ONE TIME WEEKLY SAME DAY EACH WEEK    sertraline (ZOLOFT) 25 MG tablet   Yes No    Take 1 tablet by mouth Daily.          ED Medications:    Medications   sodium chloride 0.9 % flush 10 mL (has no administration in time  "range)   nitroglycerin (NITROSTAT) SL tablet 0.4 mg (0.4 mg Sublingual Given 12/31/24 0631)   aluminum-magnesium hydroxide-simethicone (MAALOX MAX) 400-400-40 MG/5ML 30 mL suspension ( Oral Given 12/31/24 0522)   ondansetron (ZOFRAN) injection 4 mg (4 mg Intravenous Given 12/31/24 0522)   iopamidol (ISOVUE-300) 61 % injection 100 mL (100 mL Intravenous Given 12/31/24 0514)   aspirin chewable tablet 324 mg (324 mg Oral Given 12/31/24 0629)     Vital Signs:  Temp:  [97.5 °F (36.4 °C)] 97.5 °F (36.4 °C)  Heart Rate:  [67-71] 68  Resp:  [18] 18  BP: (151-226)/() 151/84        12/31/24  0403   Weight: 73.5 kg (162 lb)     Body mass index is 28.7 kg/m².    Physical Exam:     Most recent vital Signs: /84   Pulse 68   Temp 97.5 °F (36.4 °C) (Oral)   Resp 18   Ht 160 cm (63\")   Wt 73.5 kg (162 lb)   SpO2 93%   BMI 28.70 kg/m²     Physical Exam  Vitals and nursing note reviewed.   Constitutional:       Comments: Chronically ill middle-aged female.   HENT:      Head: Normocephalic.      Nose: Nose normal.      Mouth/Throat:      Mouth: Mucous membranes are moist.   Eyes:      Pupils: Pupils are equal, round, and reactive to light.   Cardiovascular:      Rate and Rhythm: Normal rate and regular rhythm.      Pulses: Normal pulses.      Heart sounds: Murmur heard.   Pulmonary:      Effort: Pulmonary effort is normal.      Breath sounds: Normal breath sounds.      Comments: On room air.  Abdominal:      General: Bowel sounds are normal.      Palpations: Abdomen is soft.   Musculoskeletal:      Cervical back: Normal range of motion.      Comments: Reproducible chest pain noted epigastric area with palpation.   Skin:     General: Skin is warm.      Capillary Refill: Capillary refill takes less than 2 seconds.   Neurological:      General: No focal deficit present.      Mental Status: She is alert and oriented to person, place, and time.   Psychiatric:         Mood and Affect: Mood normal.         Laboratory " data:    I have reviewed the labs done in the emergency room.    Results from last 7 days   Lab Units 12/31/24  0418   SODIUM mmol/L 139   POTASSIUM mmol/L 4.1   CHLORIDE mmol/L 104   CO2 mmol/L 25.0   BUN mg/dL 21   CREATININE mg/dL 1.08*   CALCIUM mg/dL 9.9   BILIRUBIN mg/dL 0.2   ALK PHOS U/L 98   ALT (SGPT) U/L 17   AST (SGOT) U/L 22   GLUCOSE mg/dL 197*     Results from last 7 days   Lab Units 12/31/24  0418   WBC 10*3/mm3 2.92*   HEMOGLOBIN g/dL 12.4   HEMATOCRIT % 37.5   PLATELETS 10*3/mm3 203         Results from last 7 days   Lab Units 12/31/24  0527 12/31/24 0418   HSTROP T ng/L 16* 16*       EKG:      Sinus rhythm with right bundle branch block bpm 70    Radiology:    CT Angiogram Chest    Result Date: 12/31/2024  FINAL REPORT TECHNIQUE: null CLINICAL HISTORY: Chest pain w/ radiation to back, hypertensive (BP >200) COMPARISON: null FINDINGS: CTA chest Comparison: None provided. Findings: No pulmonary embolism evident. Thoracic aortic arteriosclerosis with 3.2 cm slight ectasia proximal descending portion. No dissection. Coronary artery arteriosclerosis. Minimal linear densities bilateral bases probably atelectasis/scarring. Minimal patchy particularly left lower lobe groundglass densities may be related to same but cannot exclude minimal atypical pneumonia. No airspace consolidation, pleural effusion, or pneumothorax. No pulmonary mass or adenopathy. Slight prominence esophageal wall but partially collapsed. Fecal retention. Cholecystectomy. Slight nodular thickening left-greater-than-right adrenal glands.     Impression: 1. No pulmonary embolism evident. 2. Minimal patchy particularly left lower lobe groundglass densities may be incidental but cannot exclude minimal atypical pneumonia. Authenticated and Electronically Signed by Arleth Zamarripa MD on 12/31/2024 07:07:32 AM     Assessment:    Chest pain, likely musculoskeletal due to cough/hypertensive urgency, POA  Hypertensive urgency  Diabetes mellitus  type 2  Depression/anxiety  Chronic kidney disease stage III      Plan:    -Patient taken for cardiac cath with normal coronary arteries.  -Chest pain likely secondary to hypertensive urgency/musculoskeletal/costochondritis with recent bronchitis.  -Continue aspirin and statin.  -Monitor blood pressure closely/: Continue on telemetry.  -Continue IV fluids until a.m. given underlying chronic kidney disease.  -Further orders pending clinical course.    Risk Assessment: High  DVT Prophylaxis: Heparin  Code Status: Full code  Diet: Cardiac    Advance Care Planning  ACP discussion was declined by the patient. Patient does not have an advance directive, declines further assistance.           FREDDY Weaver  24  07:23 EST    Dictated utilizing Dragon dictation.    Electronically signed by Taty Mane APRN at 24 1313          Emergency Department Notes        Piper Jin RN at 24 0926          Report given to rebeca in cath lab but states they still have two cases ahead of the patient and will give a call when they are coming to get her.     Electronically signed by Piper Jin RN at 24 0927       Dougie Chong MD at 24 0432           EMERGENCY DEPARTMENT ENCOUNTER    Pt Name: Catrachito Schuler  MRN: [id number]  Pt :   1950  Room Number:    Date of encounter:  2024  PCP: Keysha Guerra DO  ED Provider: Dougie Chong MD    Historian: Patient, daughter      HPI:  Chief Complaint: Chest pain        Context: Catrachito Schuler is a 74 y.o. female who presents to the ED c/o chest pain.  Patient says couple hours ago she woke up from sleep with retrosternal chest pain that radiates to her back.  She says that associated with nausea and an episode of vomiting.  She says she has never had pain like this before.  Did not take anything for the pain prior to coming to the hospital.      PAST MEDICAL HISTORY  Past Medical History:   Diagnosis Date     Cancer     Diabetes mellitus     GERD (gastroesophageal reflux disease)     Hyperlipidemia     Hypertension     Kidney disease     Murmur     Myocardial infarction          PAST SURGICAL HISTORY  Past Surgical History:   Procedure Laterality Date    HYSTERECTOMY      MASTECTOMY, PARTIAL           FAMILY HISTORY  History reviewed. No pertinent family history.      SOCIAL HISTORY  Social History     Socioeconomic History    Marital status:    Tobacco Use    Smoking status: Never    Smokeless tobacco: Never   Vaping Use    Vaping status: Never Used   Substance and Sexual Activity    Alcohol use: Never    Drug use: Never    Sexual activity: Defer         ALLERGIES  Patient has no known allergies.        REVIEW OF SYSTEMS    All systems reviewed and negative except for those discussed in HPI.       PHYSICAL EXAM    I have reviewed the triage vital signs and nursing notes.    ED Triage Vitals [12/31/24 0403]   Temp Heart Rate Resp BP SpO2   97.5 °F (36.4 °C) 71 18 (!) 226/113 94 %      Temp src Heart Rate Source Patient Position BP Location FiO2 (%)   Oral Monitor Sitting Right arm --         General: Moderate acute distress  Skin: normal color, warm and dry  Head: normocephalic, atraumatic  Eyes: Pupils equally round and reactive to light.  Nose: normal nasal mucosa, no visible deformity.  Mouth: moist mucous membranes.  Neck: supple.  Chest: no retractions, no visible deformity  Cardiovascular: Regular rate and rhythm.  Lungs: clear to auscultation bilaterally.  Abdomen: soft, non-tender, non-distended. No rebound tenderness, no guarding.  No peritonitis.  Extremities: no cyanosis or edema. Palpable radial pulses bilaterally. Palpable dorsalis pedis pulses bilaterally.  No unilateral lower extremity swelling or erythema.  Neuro:  alert and oriented x3, no focal neurological deficits.  Psych:  appropriate mood and behavior.        LAB RESULTS  Recent Results (from the past 24 hours)   Comprehensive Metabolic  Panel    Collection Time: 12/31/24  4:18 AM    Specimen: Blood   Result Value Ref Range    Glucose 197 (H) 65 - 99 mg/dL    BUN 21 8 - 23 mg/dL    Creatinine 1.08 (H) 0.57 - 1.00 mg/dL    Sodium 139 136 - 145 mmol/L    Potassium 4.1 3.5 - 5.2 mmol/L    Chloride 104 98 - 107 mmol/L    CO2 25.0 22.0 - 29.0 mmol/L    Calcium 9.9 8.6 - 10.5 mg/dL    Total Protein 7.8 6.0 - 8.5 g/dL    Albumin 4.4 3.5 - 5.2 g/dL    ALT (SGPT) 17 1 - 33 U/L    AST (SGOT) 22 1 - 32 U/L    Alkaline Phosphatase 98 39 - 117 U/L    Total Bilirubin 0.2 0.0 - 1.2 mg/dL    Globulin 3.4 gm/dL    A/G Ratio 1.3 g/dL    BUN/Creatinine Ratio 19.4 7.0 - 25.0    Anion Gap 10.0 5.0 - 15.0 mmol/L    eGFR 53.3 (L) >60.0 mL/min/1.73   High Sensitivity Troponin T    Collection Time: 12/31/24  4:18 AM    Specimen: Blood   Result Value Ref Range    HS Troponin T 16 (H) <14 ng/L   Green Top (Gel)    Collection Time: 12/31/24  4:18 AM   Result Value Ref Range    Extra Tube Hold for add-ons.    Lavender Top    Collection Time: 12/31/24  4:18 AM   Result Value Ref Range    Extra Tube hold for add-on    Gold Top - SST    Collection Time: 12/31/24  4:18 AM   Result Value Ref Range    Extra Tube Hold for add-ons.    Light Blue Top    Collection Time: 12/31/24  4:18 AM   Result Value Ref Range    Extra Tube Hold for add-ons.    CBC Auto Differential    Collection Time: 12/31/24  4:18 AM    Specimen: Blood   Result Value Ref Range    WBC 2.92 (L) 3.40 - 10.80 10*3/mm3    RBC 4.05 3.77 - 5.28 10*6/mm3    Hemoglobin 12.4 12.0 - 15.9 g/dL    Hematocrit 37.5 34.0 - 46.6 %    MCV 92.6 79.0 - 97.0 fL    MCH 30.6 26.6 - 33.0 pg    MCHC 33.1 31.5 - 35.7 g/dL    RDW 14.1 12.3 - 15.4 %    RDW-SD 48.3 37.0 - 54.0 fl    MPV 10.3 6.0 - 12.0 fL    Platelets 203 140 - 450 10*3/mm3    Neutrophil % 41.1 (L) 42.7 - 76.0 %    Lymphocyte % 39.7 19.6 - 45.3 %    Monocyte % 8.9 5.0 - 12.0 %    Eosinophil % 7.5 (H) 0.3 - 6.2 %    Basophil % 1.4 0.0 - 1.5 %    Immature Grans % 1.4 (H) 0.0 -  0.5 %    Neutrophils, Absolute 1.20 (L) 1.70 - 7.00 10*3/mm3    Lymphocytes, Absolute 1.16 0.70 - 3.10 10*3/mm3    Monocytes, Absolute 0.26 0.10 - 0.90 10*3/mm3    Eosinophils, Absolute 0.22 0.00 - 0.40 10*3/mm3    Basophils, Absolute 0.04 0.00 - 0.20 10*3/mm3    Immature Grans, Absolute 0.04 0.00 - 0.05 10*3/mm3    nRBC 0.0 0.0 - 0.2 /100 WBC   High Sensitivity Troponin T 1Hr    Collection Time: 12/31/24  5:27 AM    Specimen: Blood   Result Value Ref Range    HS Troponin T 16 (H) <14 ng/L    Troponin T Numeric Delta 0 ng/L    Troponin T % Delta 0 Abnormal if >/= 20% %       If labs were ordered, I independently reviewed the results and considered them in treating the patient.  See medical decision making discussion section for my interpretation of lab results.        RADIOLOGY  CT Angiogram Chest    Result Date: 12/31/2024  FINAL REPORT TECHNIQUE: null CLINICAL HISTORY: Chest pain w/ radiation to back, hypertensive (BP >200) COMPARISON: null FINDINGS: CTA chest Comparison: None provided. Findings: No pulmonary embolism evident. Thoracic aortic arteriosclerosis with 3.2 cm slight ectasia proximal descending portion. No dissection. Coronary artery arteriosclerosis. Minimal linear densities bilateral bases probably atelectasis/scarring. Minimal patchy particularly left lower lobe groundglass densities may be related to same but cannot exclude minimal atypical pneumonia. No airspace consolidation, pleural effusion, or pneumothorax. No pulmonary mass or adenopathy. Slight prominence esophageal wall but partially collapsed. Fecal retention. Cholecystectomy. Slight nodular thickening left-greater-than-right adrenal glands.     Impression: 1. No pulmonary embolism evident. 2. Minimal patchy particularly left lower lobe groundglass densities may be incidental but cannot exclude minimal atypical pneumonia. Authenticated and Electronically Signed by Arleth Zamarripa MD on 12/31/2024 07:07:32 AM     I ordered and independently  reviewed the above noted radiographic studies.  See radiologist's dictation for official interpretation.    Per my independent reading: Chest radiograph demonstrates cardiomegaly based on my independent reading but otherwise negative for acute cardiopulmonary findings.            PROCEDURES    Procedures    ECG 12 Lead ED Triage Standing Order; Chest Pain   Final Result          MEDICATIONS GIVEN IN ER    Medications   sodium chloride 0.9 % flush 10 mL (has no administration in time range)   nitroglycerin (NITROSTAT) SL tablet 0.4 mg (0.4 mg Sublingual Given 12/31/24 0631)   aluminum-magnesium hydroxide-simethicone (MAALOX MAX) 400-400-40 MG/5ML 30 mL suspension ( Oral Given 12/31/24 0522)   ondansetron (ZOFRAN) injection 4 mg (4 mg Intravenous Given 12/31/24 0522)   iopamidol (ISOVUE-300) 61 % injection 100 mL (100 mL Intravenous Given 12/31/24 0514)   aspirin chewable tablet 324 mg (324 mg Oral Given 12/31/24 0629)         MEDICAL DECISION MAKING, PROGRESS, and CONSULTS    All labs, if obtained, have been independently reviewed by me.  All radiology studies, if obtained, have been reviewed by me and the radiologist dictating the report.  All EKG's, if obtained, have been independently viewed and interpreted by me/my attending physician.      Discussion below represents my analysis of pertinent findings related to patient's condition, differential diagnosis, treatment plan and final disposition.          HEART Score: 7                Differential diagnosis:    Differential diagnosis for this patient includes acute coronary syndrome, pneumothorax, pulmonary embolism, pericarditis, myocarditis, cardiac tamponade, pericardial effusion, aortic dissection, Boerhaave syndrome, musculoskeletal pain, reflux, other acute emergency.    Medical Decision Making Discussion:    Vitals reviewed and demonstrate hypertension but otherwise are normal.    EKG obtained based on my independent reading shows normal sinus rhythm.  Right  bundle branch block.  No acute ischemic changes.  This EKG is unchanged in comparison to previous from December 5, 2023.    Initial troponin 16.  Repeat shows no significant delta.    Given significant hypertension with chest pain radiating to the back CT angio chest obtained and is negative for evidence of PE or dissection.  Questionable atypical pneumonia left lower lobe per radiology.  No symptoms of pneumonia clinically.    Given advanced age with acute onset of chest pain associate with nausea, significant hypertension and no previous invasive cardiac workup she is thought to be appropriate for hospitalization.    Additional sources:    - Discussed/ obtained information from independent historians:  daughter    - External (non-ED) record review: Cardiology note from August 2024 documenting history of diabetes, hyperlipidemia, hypertension.  Murmur on exam suspicious for aortic stenosis.    - Chronic or social conditions impacting care:  diabetes, hypertension    Shared Decision Making:  After my consideration of clinical presentation and any laboratory/radiology studies obtained, I discussed the findings with the patient/patient representative who is in agreement with the treatment plan and the final disposition.   Risks and benefits of discharge and/or observation/admission were discussed.    Orders placed during this visit:  Orders Placed This Encounter   Procedures    XR Chest 1 View    CT Angiogram Chest    [place] Draw    Comprehensive Metabolic Panel    High Sensitivity Troponin T    CBC Auto Differential    High Sensitivity Troponin T 1Hr    NPO Diet NPO Type: Strict NPO    Undress & Gown    Continuous Pulse Oximetry    Oxygen Therapy- Nasal Cannula; Titrate 1-6 LPM Per SpO2; 90 - 95%    ECG 12 Lead ED Triage Standing Order; Chest Pain    Insert Peripheral IV    CBC & Differential    Green Top (Gel)    Lavender Top    Gold Top - SST    Light Blue Top             AS OF 07:13 EST VITALS:    BP - 151/84  HR  - 68  TEMP - 97.5 °F (36.4 °C) (Oral)  O2 SATS - 93%                  DIAGNOSIS  Final diagnoses:   Chest pain, unspecified type         DISPOSITION  Admit      Please note that portions of this document were completed with voice recognition software.        Dougie Chong MD  12/31/24 0713      Electronically signed by Dougie Chong MD at 12/31/24 0713       Vital Signs (last day)       Date/Time Temp Temp src Pulse Resp BP Patient Position SpO2    12/31/24 1400 -- -- 65 -- 135/72 -- 91    12/31/24 1345 -- -- 65 -- 140/74 -- 93    12/31/24 1330 -- -- 65 -- 141/74 -- 91    12/31/24 1315 -- -- 67 -- 152/76 -- 93    12/31/24 1300 -- -- 65 -- 147/76 -- 94    12/31/24 1203 97.7 (36.5) Oral 69 16 154/74 Lying 90    12/31/24 11:35:02 -- -- 62 13 179/84 -- 96    12/31/24 10:54:44 -- -- 89 20 171/93 -- 90    12/31/24 10:54:02 -- -- -- -- -- -- 90    12/31/24 1039 -- -- 69 -- 168/94 -- --    12/31/24 1001 -- -- 66 -- 156/84 -- 92    12/31/24 1000 -- -- 66 -- -- -- 91    12/31/24 0955 -- -- 66 -- -- -- 92    12/31/24 0931 -- -- 65 -- 153/84 -- 94    12/31/24 0925 -- -- 65 -- -- -- 95    12/31/24 09:01:39 -- -- -- -- 148/88 -- --    12/31/24 0900 -- -- 65 -- -- -- 91    12/31/24 0859 -- -- 65 -- -- -- 94    12/31/24 0824 -- -- -- -- 159/82 -- --    12/31/24 0803 97.8 (36.6) Oral 69 16 159/82 Lying 96    12/31/24 0541 -- -- 68 -- 151/84 -- 93    12/31/24 0538 -- -- 67 -- 156/83 -- 93    12/31/24 0534 -- -- 67 -- 163/86 -- --    12/31/24 0403 97.5 (36.4) Oral 71 18 226/113 Sitting 94          Current Facility-Administered Medications   Medication Dose Route Frequency Provider Last Rate Last Admin    acetaminophen (TYLENOL) tablet 650 mg  650 mg Oral Q4H PRN Jono Monteiro MD        amLODIPine (NORVASC) tablet 10 mg  10 mg Oral Daily Jono Monteiro MD   10 mg at 12/31/24 1254    [START ON 1/1/2025] aspirin EC tablet 81 mg  81 mg Oral Daily Jono Monteiro MD        atorvastatin (LIPITOR) tablet 80 mg  80 mg Oral  Nightly Jono Monteiro MD        sennosides-docusate (PERICOLACE) 8.6-50 MG per tablet 2 tablet  2 tablet Oral BID PRN Taty Mane APRN        And    polyethylene glycol (MIRALAX) packet 17 g  17 g Oral Daily PRN Taty Mane APRN        And    bisacodyl (DULCOLAX) EC tablet 5 mg  5 mg Oral Daily PRN Taty Mane APRN        And    bisacodyl (DULCOLAX) suppository 10 mg  10 mg Rectal Daily PRN Taty Mane APRN        carvedilol (COREG) tablet 25 mg  25 mg Oral Q12H Jono Monteiro MD   25 mg at 12/31/24 1254    diphenhydrAMINE (BENADRYL) injection 25 mg  25 mg Intravenous Q6H PRN Jono Monteiro MD        famotidine (PEPCID) tablet 20 mg  20 mg Oral BID Jono Monteiro MD   20 mg at 12/31/24 1254    gabapentin (NEURONTIN) capsule 400 mg  400 mg Oral 4x Daily Jono Monteiro MD   400 mg at 12/31/24 1254    heparin (porcine) 5000 UNIT/ML injection 5,000 Units  5,000 Units Subcutaneous Q12H Taty Mane APRN        hydrALAZINE (APRESOLINE) tablet 100 mg  100 mg Oral BID Jono Monteiro MD        HYDROcodone-acetaminophen (NORCO) 5-325 MG per tablet 1 tablet  1 tablet Oral Q12H PRN Jono Monteiro MD        HYDROcodone-acetaminophen (NORCO) 5-325 MG per tablet 1 tablet  1 tablet Oral Q4H PRN Jono Monteiro MD        LORazepam (ATIVAN) tablet 2 mg  2 mg Oral Q6H PRN Jono Monteiro MD        meclizine (ANTIVERT) tablet 25 mg  25 mg Oral TID PRN Jono Monteiro MD        morphine injection 4 mg  4 mg Intravenous Q1H PRN Jono Monteiro MD        And    naloxone (NARCAN) injection 0.4 mg  0.4 mg Intravenous Q5 Min PRN Jono Monteiro MD        nitroglycerin (NITROSTAT) SL tablet 0.4 mg  0.4 mg Sublingual Q5 Min PRN Taty Mane APRN        ondansetron (ZOFRAN) injection 4 mg  4 mg Intravenous Q6H PRN Taty Mane APRN        ondansetron ODT (ZOFRAN-ODT) disintegrating tablet 4 mg  4 mg Oral Q6H PRN Jono Monteiro MD   4 mg at 12/31/24 9074     "sacubitril-valsartan (ENTRESTO) 24-26 MG tablet 1 tablet  1 tablet Oral Q12H Jono Monteiro MD   1 tablet at 12/31/24 1254    sodium chloride 0.9 % flush 10 mL  10 mL Intravenous PRN Jono Monteiro MD        sodium chloride 0.9 % flush 10 mL  10 mL Intravenous Q12H Alhaji, Taty J, APRN   10 mL at 12/31/24 1229    sodium chloride 0.9 % flush 10 mL  10 mL Intravenous PRN Alhaji, Taty J, APRN        sodium chloride 0.9 % infusion 40 mL  40 mL Intravenous PRN Alhaji, Taty J, APRN        sodium chloride 0.9 % infusion  100 mL/hr Intravenous Continuous Alhaji, Taty J, APRN 100 mL/hr at 12/31/24 1227 100 mL/hr at 12/31/24 1227    temazepam (RESTORIL) capsule 15 mg  15 mg Oral Nightly PRN Jono Monteiro MD         Lab Results (last 24 hours)       Procedure Component Value Units Date/Time    Procalcitonin [[id number]]  (Normal) Collected: 12/31/24 0418    Specimen: Blood Updated: 12/31/24 0819     Procalcitonin 0.04 ng/mL     Narrative:      As a Marker for Sepsis (Non-Neonates):    1. <0.5 ng/mL represents a low risk of severe sepsis and/or septic shock.  2. >2 ng/mL represents a high risk of severe sepsis and/or septic shock.    As a Marker for Lower Respiratory Tract Infections that require antibiotic therapy:    PCT on Admission    Antibiotic Therapy       6-12 Hrs later    >0.5                Strongly Recommended  >0.25 - <0.5        Recommended   0.1 - 0.25          Discouraged              Remeasure/reassess PCT  <0.1                Strongly Discouraged     Remeasure/reassess PCT    As 28 day mortality risk marker: \"Change in Procalcitonin Result\" (>80% or <=80%) if Day 0 (or Day 1) and Day 4 values are available. Refer to http://www.[employer]-pct-calculator.com    Change in PCT <=80%  A decrease of PCT levels below or equal to 80% defines a positive change in PCT test result representing a higher risk for 28-day all-cause mortality of patients diagnosed with severe sepsis for septic shock.    Change " in PCT >80%  A decrease of PCT levels of more than 80% defines a negative change in PCT result representing a lower risk for 28-day all-cause mortality of patients diagnosed with severe sepsis or septic shock.       High Sensitivity Troponin T 1Hr [[id number]]  (Abnormal) Collected: 12/31/24 0527    Specimen: Blood Updated: 12/31/24 0551     HS Troponin T 16 ng/L      Troponin T Numeric Delta 0 ng/L      Troponin T % Delta 0 %     Narrative:      High Sensitive Troponin T Reference Range:  <14.0 ng/L- Negative Female for AMI  <22.0 ng/L- Negative Male for AMI  >=14 - Abnormal Female indicating possible myocardial injury.  >=22 - Abnormal Male indicating possible myocardial injury.   Clinicians would have to utilize clinical acumen, EKG, Troponin, and serial changes to determine if it is an Acute Myocardial Infarction or myocardial injury due to an underlying chronic condition.         High Sensitivity Troponin T [[id number]]  (Abnormal) Collected: 12/31/24 0418    Specimen: Blood Updated: 12/31/24 0441     HS Troponin T 16 ng/L     Narrative:      High Sensitive Troponin T Reference Range:  <14.0 ng/L- Negative Female for AMI  <22.0 ng/L- Negative Male for AMI  >=14 - Abnormal Female indicating possible myocardial injury.  >=22 - Abnormal Male indicating possible myocardial injury.   Clinicians would have to utilize clinical acumen, EKG, Troponin, and serial changes to determine if it is an Acute Myocardial Infarction or myocardial injury due to an underlying chronic condition.         Comprehensive Metabolic Panel [[id number]]  (Abnormal) Collected: 12/31/24 0418    Specimen: Blood Updated: 12/31/24 0438     Glucose 197 mg/dL      Comment: Glucose >180, Hemoglobin A1C recommended.        BUN 21 mg/dL      Creatinine 1.08 mg/dL      Sodium 139 mmol/L      Potassium 4.1 mmol/L      Chloride 104 mmol/L      CO2 25.0 mmol/L      Calcium 9.9 mg/dL      Total Protein 7.8 g/dL      Albumin 4.4 g/dL      ALT (SGPT) 17 U/L       AST (SGOT) 22 U/L      Alkaline Phosphatase 98 U/L      Total Bilirubin 0.2 mg/dL      Globulin 3.4 gm/dL      A/G Ratio 1.3 g/dL      BUN/Creatinine Ratio 19.4     Anion Gap 10.0 mmol/L      eGFR 53.3 mL/min/1.73     Narrative:      GFR Categories in Chronic Kidney Disease (CKD)      GFR Category          GFR (mL/min/1.73)    Interpretation  G1                     90 or greater         Normal or high (1)  G2                      60-89                Mild decrease (1)  G3a                   45-59                Mild to moderate decrease  G3b                   30-44                Moderate to severe decrease  G4                    15-29                Severe decrease  G5                    14 or less           Kidney failure          (1)In the absence of evidence of kidney disease, neither GFR category G1 or G2 fulfill the criteria for CKD.    eGFR calculation 2021 CKD-EPI creatinine equation, which does not include race as a factor    CBC & Differential [[id number]]  (Abnormal) Collected: 12/31/24 0418    Specimen: Blood Updated: 12/31/24 0437    Narrative:      The following orders were created for panel order CBC & Differential.  Procedure                               Abnormality         Status                     ---------                               -----------         ------                     CBC Auto Differential[[id number]]        Abnormal            Final result                 Please view results for these tests on the individual orders.    CBC Auto Differential [[id number]]  (Abnormal) Collected: 12/31/24 0418    Specimen: Blood Updated: 12/31/24 0437     WBC 2.92 10*3/mm3      RBC 4.05 10*6/mm3      Hemoglobin 12.4 g/dL      Hematocrit 37.5 %      MCV 92.6 fL      MCH 30.6 pg      MCHC 33.1 g/dL      RDW 14.1 %      RDW-SD 48.3 fl      MPV 10.3 fL      Platelets 203 10*3/mm3      Neutrophil % 41.1 %      Lymphocyte % 39.7 %      Monocyte % 8.9 %      Eosinophil % 7.5 %      Basophil % 1.4 %       Immature Grans % 1.4 %      Neutrophils, Absolute 1.20 10*3/mm3      Lymphocytes, Absolute 1.16 10*3/mm3      Monocytes, Absolute 0.26 10*3/mm3      Eosinophils, Absolute 0.22 10*3/mm3      Basophils, Absolute 0.04 10*3/mm3      Immature Grans, Absolute 0.04 10*3/mm3      nRBC 0.0 /100 WBC     [place] Draw [[id number]] Collected: 12/31/24 0418    Specimen: Blood Updated: 12/31/24 0430    Narrative:      The following orders were created for panel order [place] Draw.  Procedure                               Abnormality         Status                     ---------                               -----------         ------                     Green Top (Gel)[[id number]]                                  Final result               Lavender Top[[id number]]                                     Final result               Gold Top - SST[[id number]]                                   Final result               Light Blue Top[[id number]]                                   Final result                 Please view results for these tests on the individual orders.    Green Top (Gel) [[id number]] Collected: 12/31/24 0418    Specimen: Blood Updated: 12/31/24 0430     Extra Tube Hold for add-ons.     Comment: Auto resulted.       Lavender Top [[id number]] Collected: 12/31/24 0418    Specimen: Blood Updated: 12/31/24 0430     Extra Tube hold for add-on     Comment: Auto resulted       Gold Top - SST [[id number]] Collected: 12/31/24 0418    Specimen: Blood Updated: 12/31/24 0430     Extra Tube Hold for add-ons.     Comment: Auto resulted.       Light Blue Top [[id number]] Collected: 12/31/24 0418    Specimen: Blood Updated: 12/31/24 0430     Extra Tube Hold for add-ons.     Comment: Auto resulted             Physician Progress Notes (last 24 hours)  Notes from 12/30/24 1422 through 12/31/24 1422   No notes of this type exist for this encounter.          Consult Notes (last 24 hours)        Jono Monteiro MD at 12/31/24 0845        Consult  "Orders    1. Inpatient Cardiology Consult [[id number]] ordered by Taty Mane APRN at 12/31/24 0722                 [facility]-Cardiology Consult Note    Referring Provider: Diane  Reason for Consultation: Chest pain    Patient Care Team:  Keysha Guerra DO as PCP - General (Family Medicine)    Chief complaint : Chest pain    Subjective:    History of present illness: This is a 74-year-old female patient who presents to the emergency room with onset of chest discomfort awakening her from sleep at 3 AM.  The patient described a pressure sensation in her central chest with severe pain across her shoulder blades.  In the emergency room her twelve-lead electrocardiogram showed sinus rhythm with a right bundle branch block and nonspecific T wave changes but no high risk ischemic ST segment deviation.  Her presenting systolic blood pressure was 213 mmHg.  She has a history of poorly controlled hypertension.  She reports compliance with her medications.  Cardiac troponins were mildly elevated.  CT scan of the chest showed no evidence of pulmonary embolus.  There was no evidence of aortic dissection.  She has a history of dyslipidemia, type 2 diabetes mellitus and chronic renal insufficiency.  She is a lifelong non-smoker.  She has no personal history of myocardial infarction or coronary revascularization.  She has a history of a \"heart murmur\".  She is followed by Dr. Pham as an outpatient.    Review of Systems   Review of Systems   Constitutional: Negative for chills, diaphoresis, fever, malaise/fatigue, weight gain and weight loss.   HENT:  Negative for ear discharge, hearing loss, hoarse voice and nosebleeds.    Eyes:  Negative for discharge, double vision, pain and photophobia.   Cardiovascular:  Positive for chest pain. Negative for claudication, cyanosis, dyspnea on exertion, irregular heartbeat, leg swelling, near-syncope, orthopnea, palpitations, paroxysmal nocturnal dyspnea and syncope.   Respiratory:  Negative " "for cough, hemoptysis, sputum production and wheezing.    Endocrine: Negative for cold intolerance, heat intolerance, polydipsia, polyphagia and polyuria.   Hematologic/Lymphatic: Negative for adenopathy and bleeding problem. Does not bruise/bleed easily.   Skin:  Negative for color change, flushing, itching and rash.   Musculoskeletal:  Negative for muscle cramps, muscle weakness, myalgias and stiffness.   Gastrointestinal:  Negative for abdominal pain, diarrhea, hematemesis, hematochezia, nausea and vomiting.   Genitourinary:  Negative for dysuria, frequency and nocturia.   Neurological:  Negative for focal weakness, loss of balance, numbness, paresthesias and seizures.   Psychiatric/Behavioral:  Negative for altered mental status, hallucinations and suicidal ideas.    Allergic/Immunologic: Negative for HIV exposure, hives and persistent infections.       History  Past Medical History:   Diagnosis Date    Cancer     Diabetes mellitus     GERD (gastroesophageal reflux disease)     Hyperlipidemia     Hypertension     Kidney disease     Murmur     Myocardial infarction    ,   Past Surgical History:   Procedure Laterality Date    HYSTERECTOMY      MASTECTOMY, PARTIAL     , History reviewed. No pertinent family history.,   Social History     Tobacco Use    Smoking status: Never    Smokeless tobacco: Never   Vaping Use    Vaping status: Never Used   Substance Use Topics    Alcohol use: Never    Drug use: Never   , (Not in a hospital admission)   and Allergies:  Patient has no known allergies.    Objective:    Vital Sign Min/Max for last 24 hours  Temp  Min: 97.5 °F (36.4 °C)  Max: 97.8 °F (36.6 °C)   BP  Min: 151/84  Max: 226/113   Pulse  Min: 67  Max: 71   Resp  Min: 16  Max: 18   SpO2  Min: 93 %  Max: 96 %   No data recorded   Weight  Min: 73.5 kg (162 lb)  Max: 73.5 kg (162 lb 0.6 oz)     Flowsheet Rows      Flowsheet Row First Filed Value   Admission Height 160 cm (63\") Documented at 12/31/2024 0403   Admission " Weight 73.5 kg (162 lb) Documented at 12/31/2024 0403                 Physical Exam:   Vitals and nursing note reviewed.   Constitutional:       Appearance: Healthy appearance. Not in distress.   Neck:      Vascular: No JVR. JVD normal.   Pulmonary:      Effort: Pulmonary effort is normal.      Breath sounds: Normal breath sounds. No wheezing. No rhonchi. No rales.   Chest:      Chest wall: Not tender to palpatation.   Cardiovascular:      PMI at left midclavicular line. Normal rate. Regular rhythm. Normal S1. Normal S2.       Murmurs: There is a grade 2/4 high frequency blowing decrescendo, early diastolic murmur at the URSB, radiating to the apex.      No gallop.  No click. No rub.   Pulses:     Intact distal pulses.   Edema:     Peripheral edema absent.   Abdominal:      General: Bowel sounds are normal.      Palpations: Abdomen is soft.      Tenderness: There is no abdominal tenderness.   Musculoskeletal: Normal range of motion.         General: No tenderness. Skin:     General: Skin is warm and dry.   Neurological:      General: No focal deficit present.      Mental Status: Alert and oriented to person, place and time.         Results Review:   I reviewed the patient's new clinical results.  Results from last 7 days   Lab Units 12/31/24  0418   WBC 10*3/mm3 2.92*   HEMOGLOBIN g/dL 12.4   HEMATOCRIT % 37.5   PLATELETS 10*3/mm3 203     Results from last 7 days   Lab Units 12/31/24  0418   SODIUM mmol/L 139   POTASSIUM mmol/L 4.1   CHLORIDE mmol/L 104   CO2 mmol/L 25.0   BUN mg/dL 21   CREATININE mg/dL 1.08*   GLUCOSE mg/dL 197*   CALCIUM mg/dL 9.9     Lab Results   Lab Value Date/Time    TROPONINT 16 (H) 12/31/2024 0527    TROPONINT 16 (H) 12/31/2024 0418             Assessment/Plan:      Other chest pain      I have recommended invasive coronary angiography with interventional standby.  Ms. Schuler has been engaged in a patient level discussion explaining the rationale for invasive testing.  The procedure of  coronary angiography with catheter-based coronary revascularization has been explained in detail, using layman's terminology, including risks, benefits and alternatives.  She expresses understanding and desire to proceed.  Further recommendations will be predicated on the results of her catheterization findings.  In the meantime, we will escalate her antihypertensive therapy.    I discussed the patient's findings and my recommendations with patient and family    Jono WARD. MD [place]  12/31/24  08:13 EST            Electronically signed by Jono Monteiro MD at 12/31/24 2269

## 2024-12-31 NOTE — CASE MANAGEMENT/SOCIAL WORK
Discharge Planning Assessment  [facility]     Patient Name: Catrachito Schuler  MRN: [id number]  Today's Date: 12/31/2024    Admit Date: 12/31/2024    Plan: The patient is awake and able to answer questions.  Her daughter is at the bedside and she consents for her to be present for her DC planning.  She is a current patient of Dr. Guerra and gets her medications from [facility].  She elects to enroll in Meds to Bed.  She has a walker at home as needed.  She denies the need for DME or services at DC.  At the time of DC the patient plans to return home with her .  Questions and concerns were addressed, FIGUEROA delivered at the time of this conversation. Will provide additional resources and information upon request.   Discharge Needs Assessment       Row Name 12/31/24 1302       Living Environment    People in Home spouse    Name(s) of People in Home Lowell Schuler,     Current Living Arrangements home    Duration at Residence Since July 2024    Potentially Unsafe Housing Conditions none    In the past 12 months has the electric, gas, oil, or water company threatened to shut off services in your home? No    Primary Care Provided by self    Provides Primary Care For no one    Family Caregiver if Needed none    Quality of Family Relationships helpful;involved;supportive    Able to Return to Prior Arrangements yes       Resource/Environmental Concerns    Resource/Environmental Concerns none    Transportation Concerns none       Transportation Needs    In the past 12 months, has lack of transportation kept you from medical appointments or from getting medications? no    In the past 12 months, has lack of transportation kept you from meetings, work, or from getting things needed for daily living? No       Food Insecurity    Within the past 12 months, you worried that your food would run out before you got the money to buy more. Never true    Within the past 12 months, the food you bought just didn't last and you  didn't have money to get more. Never true       Transition Planning    Patient/Family Anticipates Transition to home with family    Patient/Family Anticipated Services at Transition none    Transportation Anticipated family or friend will provide       Discharge Needs Assessment    Readmission Within the Last 30 Days no previous admission in last 30 days    Equipment Currently Used at Home walker, rolling    Concerns to be Addressed denies needs/concerns at this time    Do you want help finding or keeping work or a job? I do not need or want help    Do you want help with school or training? For example, starting or completing job training or getting a high school diploma, GED or equivalent No    Anticipated Changes Related to Illness none    Equipment Needed After Discharge none    Provided Post Acute Provider List? N/A    N/A Provider List Comment Patient plans to return home; no new needs    Provided Post Acute Provider Quality & Resource List? N/A    N/A Quality & Resource List Comment Patient plans to return home; no new needs    Offered/Gave Vendor List no                   Discharge Plan       Row Name 12/31/24 7226       Plan    Plan The patient is awake and able to answer questions.  Her daughter is at the bedside and she consents for her to be present for her DC planning.  She is a current patient of Dr. Guerra and gets her medications from [facility].  She elects to enroll in Meds to Bed.  She has a walker at home as needed.  She denies the need for DME or services at DC.  At the time of DC the patient plans to return home with her .  Questions and concerns were addressed, FIGUEROA delivered at the time of this conversation. Will provide additional resources and information upon request.    Patient/Family in Agreement with Plan yes    Provided Post Acute Provider List? N/A    N/A Provider List Comment Patient plans to return home; no new needs    Provided Post Acute Provider Quality & Resource List? N/A     N/A Quality & Resource List Comment Patient plans to return home; no new needs    Plan Comments No needs at this time    Final Discharge Disposition Code 01 - home or self-care    Final Note Plans to return home with                   Continued Care and Services - Admitted Since 12/31/2024    No active coordination exists for this encounter.          Demographic Summary       Row Name 12/31/24 1301       General Information    Admission Type observation    Arrived From emergency department    Required Notices Provided Observation Status Notice    Referral Source admission list    Reason for Consult discharge planning    Preferred Language English       Contact Information    Permission Granted to Share Info With ;family/designee                   Functional Status       Row Name 12/31/24 1301       Functional Status    Usual Activity Tolerance good    Current Activity Tolerance moderate       Physical Activity    On average, how many days per week do you engage in moderate to strenuous exercise (like a brisk walk)? 0 days    On average, how many minutes do you engage in exercise at this level? 0 min    Number of minutes of exercise per week 0       Assessment of Health Literacy    How often do you have someone help you read hospital materials? Never    How often do you have problems learning about your medical condition because of difficulty understanding written information? Never    How often do you have a problem understanding what is told to you about your medical condition? Never    How confident are you filling out medical forms by yourself? Extremely    Health Literacy Excellent       Functional Status, IADL    Medications independent    Meal Preparation independent    Housekeeping independent    Laundry independent    Shopping independent    If for any reason you need help with day-to-day activities such as bathing, preparing meals, shopping, managing finances, etc., do you get the help  you need? I don't need any help       Mental Status    General Appearance WDL WDL       Mental Status Summary    Recent Changes in Mental Status/Cognitive Functioning no changes                   Psychosocial       Row Name 12/31/24 1301       Values/Beliefs    Spiritual, Cultural Beliefs, Episcopal Practices, Values that Affect Care no       Behavior WDL    Behavior WDL WDL       Emotion Mood WDL    Emotion/Mood/Affect WDL WDL       Speech WDL    Speech WDL WDL       Perceptual State WDL    Perceptual State WDL WDL       Thought Process WDL    Thought Process WDL WDL       Intellectual Performance WDL    Intellectual Performance WDL WDL                   Abuse/Neglect       Row Name 12/31/24 1301       Personal Safety    Feels Unsafe at Home or Work/School no    Feels Threatened by Someone no    Does Anyone Try to Keep You From Having Contact with Others or Doing Things Outside Your Home? no    Physical Signs of Abuse Present no                   Legal       Row Name 12/31/24 1301       Financial Resource Strain    How hard is it for you to pay for the very basics like food, housing, medical care, and heating? Not hard                   Substance Abuse       Row Name 12/31/24 1301       Substance Use    Substance Use Status never used                   Patient Forms       Row Name 12/31/24 1305       Patient Forms    Patient Observation Letter Delivered    Delivered to Patient    Method of delivery In person                      Mell Lombardo RN

## 2024-12-31 NOTE — ED PROVIDER NOTES
EMERGENCY DEPARTMENT ENCOUNTER    Pt Name: Catrachito Schuler  MRN: [id number]  Pt :   1950  Room Number:    Date of encounter:  2024  PCP: Keysha Guerra DO  ED Provider: Dougie Chong MD    Historian: Patient, daughter      HPI:  Chief Complaint: Chest pain        Context: Catrachito Schuler is a 74 y.o. female who presents to the ED c/o chest pain.  Patient says couple hours ago she woke up from sleep with retrosternal chest pain that radiates to her back.  She says that associated with nausea and an episode of vomiting.  She says she has never had pain like this before.  Did not take anything for the pain prior to coming to the hospital.      PAST MEDICAL HISTORY  Past Medical History:   Diagnosis Date    Cancer     Diabetes mellitus     GERD (gastroesophageal reflux disease)     Hyperlipidemia     Hypertension     Kidney disease     Murmur     Myocardial infarction          PAST SURGICAL HISTORY  Past Surgical History:   Procedure Laterality Date    HYSTERECTOMY      MASTECTOMY, PARTIAL           FAMILY HISTORY  History reviewed. No pertinent family history.      SOCIAL HISTORY  Social History     Socioeconomic History    Marital status:    Tobacco Use    Smoking status: Never    Smokeless tobacco: Never   Vaping Use    Vaping status: Never Used   Substance and Sexual Activity    Alcohol use: Never    Drug use: Never    Sexual activity: Defer         ALLERGIES  Patient has no known allergies.        REVIEW OF SYSTEMS    All systems reviewed and negative except for those discussed in HPI.       PHYSICAL EXAM    I have reviewed the triage vital signs and nursing notes.    ED Triage Vitals [24 0403]   Temp Heart Rate Resp BP SpO2   97.5 °F (36.4 °C) 71 18 (!) 226/113 94 %      Temp src Heart Rate Source Patient Position BP Location FiO2 (%)   Oral Monitor Sitting Right arm --         General: Moderate acute distress  Skin: normal color, warm and dry  Head: normocephalic,  atraumatic  Eyes: Pupils equally round and reactive to light.  Nose: normal nasal mucosa, no visible deformity.  Mouth: moist mucous membranes.  Neck: supple.  Chest: no retractions, no visible deformity  Cardiovascular: Regular rate and rhythm.  Lungs: clear to auscultation bilaterally.  Abdomen: soft, non-tender, non-distended. No rebound tenderness, no guarding.  No peritonitis.  Extremities: no cyanosis or edema. Palpable radial pulses bilaterally. Palpable dorsalis pedis pulses bilaterally.  No unilateral lower extremity swelling or erythema.  Neuro:  alert and oriented x3, no focal neurological deficits.  Psych:  appropriate mood and behavior.        LAB RESULTS  Recent Results (from the past 24 hours)   Comprehensive Metabolic Panel    Collection Time: 12/31/24  4:18 AM    Specimen: Blood   Result Value Ref Range    Glucose 197 (H) 65 - 99 mg/dL    BUN 21 8 - 23 mg/dL    Creatinine 1.08 (H) 0.57 - 1.00 mg/dL    Sodium 139 136 - 145 mmol/L    Potassium 4.1 3.5 - 5.2 mmol/L    Chloride 104 98 - 107 mmol/L    CO2 25.0 22.0 - 29.0 mmol/L    Calcium 9.9 8.6 - 10.5 mg/dL    Total Protein 7.8 6.0 - 8.5 g/dL    Albumin 4.4 3.5 - 5.2 g/dL    ALT (SGPT) 17 1 - 33 U/L    AST (SGOT) 22 1 - 32 U/L    Alkaline Phosphatase 98 39 - 117 U/L    Total Bilirubin 0.2 0.0 - 1.2 mg/dL    Globulin 3.4 gm/dL    A/G Ratio 1.3 g/dL    BUN/Creatinine Ratio 19.4 7.0 - 25.0    Anion Gap 10.0 5.0 - 15.0 mmol/L    eGFR 53.3 (L) >60.0 mL/min/1.73   High Sensitivity Troponin T    Collection Time: 12/31/24  4:18 AM    Specimen: Blood   Result Value Ref Range    HS Troponin T 16 (H) <14 ng/L   Green Top (Gel)    Collection Time: 12/31/24  4:18 AM   Result Value Ref Range    Extra Tube Hold for add-ons.    Lavender Top    Collection Time: 12/31/24  4:18 AM   Result Value Ref Range    Extra Tube hold for add-on    Gold Top - SST    Collection Time: 12/31/24  4:18 AM   Result Value Ref Range    Extra Tube Hold for add-ons.    Light Blue Top     Collection Time: 12/31/24  4:18 AM   Result Value Ref Range    Extra Tube Hold for add-ons.    CBC Auto Differential    Collection Time: 12/31/24  4:18 AM    Specimen: Blood   Result Value Ref Range    WBC 2.92 (L) 3.40 - 10.80 10*3/mm3    RBC 4.05 3.77 - 5.28 10*6/mm3    Hemoglobin 12.4 12.0 - 15.9 g/dL    Hematocrit 37.5 34.0 - 46.6 %    MCV 92.6 79.0 - 97.0 fL    MCH 30.6 26.6 - 33.0 pg    MCHC 33.1 31.5 - 35.7 g/dL    RDW 14.1 12.3 - 15.4 %    RDW-SD 48.3 37.0 - 54.0 fl    MPV 10.3 6.0 - 12.0 fL    Platelets 203 140 - 450 10*3/mm3    Neutrophil % 41.1 (L) 42.7 - 76.0 %    Lymphocyte % 39.7 19.6 - 45.3 %    Monocyte % 8.9 5.0 - 12.0 %    Eosinophil % 7.5 (H) 0.3 - 6.2 %    Basophil % 1.4 0.0 - 1.5 %    Immature Grans % 1.4 (H) 0.0 - 0.5 %    Neutrophils, Absolute 1.20 (L) 1.70 - 7.00 10*3/mm3    Lymphocytes, Absolute 1.16 0.70 - 3.10 10*3/mm3    Monocytes, Absolute 0.26 0.10 - 0.90 10*3/mm3    Eosinophils, Absolute 0.22 0.00 - 0.40 10*3/mm3    Basophils, Absolute 0.04 0.00 - 0.20 10*3/mm3    Immature Grans, Absolute 0.04 0.00 - 0.05 10*3/mm3    nRBC 0.0 0.0 - 0.2 /100 WBC   High Sensitivity Troponin T 1Hr    Collection Time: 12/31/24  5:27 AM    Specimen: Blood   Result Value Ref Range    HS Troponin T 16 (H) <14 ng/L    Troponin T Numeric Delta 0 ng/L    Troponin T % Delta 0 Abnormal if >/= 20% %       If labs were ordered, I independently reviewed the results and considered them in treating the patient.  See medical decision making discussion section for my interpretation of lab results.        RADIOLOGY  CT Angiogram Chest    Result Date: 12/31/2024  FINAL REPORT TECHNIQUE: null CLINICAL HISTORY: Chest pain w/ radiation to back, hypertensive (BP >200) COMPARISON: null FINDINGS: CTA chest Comparison: None provided. Findings: No pulmonary embolism evident. Thoracic aortic arteriosclerosis with 3.2 cm slight ectasia proximal descending portion. No dissection. Coronary artery arteriosclerosis. Minimal linear  densities bilateral bases probably atelectasis/scarring. Minimal patchy particularly left lower lobe groundglass densities may be related to same but cannot exclude minimal atypical pneumonia. No airspace consolidation, pleural effusion, or pneumothorax. No pulmonary mass or adenopathy. Slight prominence esophageal wall but partially collapsed. Fecal retention. Cholecystectomy. Slight nodular thickening left-greater-than-right adrenal glands.     Impression: 1. No pulmonary embolism evident. 2. Minimal patchy particularly left lower lobe groundglass densities may be incidental but cannot exclude minimal atypical pneumonia. Authenticated and Electronically Signed by Arleth Zamarripa MD on 12/31/2024 07:07:32 AM     I ordered and independently reviewed the above noted radiographic studies.  See radiologist's dictation for official interpretation.    Per my independent reading: Chest radiograph demonstrates cardiomegaly based on my independent reading but otherwise negative for acute cardiopulmonary findings.            PROCEDURES    Procedures    ECG 12 Lead ED Triage Standing Order; Chest Pain   Final Result          MEDICATIONS GIVEN IN ER    Medications   sodium chloride 0.9 % flush 10 mL (has no administration in time range)   nitroglycerin (NITROSTAT) SL tablet 0.4 mg (0.4 mg Sublingual Given 12/31/24 0631)   aluminum-magnesium hydroxide-simethicone (MAALOX MAX) 400-400-40 MG/5ML 30 mL suspension ( Oral Given 12/31/24 0522)   ondansetron (ZOFRAN) injection 4 mg (4 mg Intravenous Given 12/31/24 0522)   iopamidol (ISOVUE-300) 61 % injection 100 mL (100 mL Intravenous Given 12/31/24 0514)   aspirin chewable tablet 324 mg (324 mg Oral Given 12/31/24 0629)         MEDICAL DECISION MAKING, PROGRESS, and CONSULTS    All labs, if obtained, have been independently reviewed by me.  All radiology studies, if obtained, have been reviewed by me and the radiologist dictating the report.  All EKG's, if obtained, have been  independently viewed and interpreted by me/my attending physician.      Discussion below represents my analysis of pertinent findings related to patient's condition, differential diagnosis, treatment plan and final disposition.          HEART Score: 7                Differential diagnosis:    Differential diagnosis for this patient includes acute coronary syndrome, pneumothorax, pulmonary embolism, pericarditis, myocarditis, cardiac tamponade, pericardial effusion, aortic dissection, Boerhaave syndrome, musculoskeletal pain, reflux, other acute emergency.    Medical Decision Making Discussion:    Vitals reviewed and demonstrate hypertension but otherwise are normal.    EKG obtained based on my independent reading shows normal sinus rhythm.  Right bundle branch block.  No acute ischemic changes.  This EKG is unchanged in comparison to previous from December 5, 2023.    Initial troponin 16.  Repeat shows no significant delta.    Given significant hypertension with chest pain radiating to the back CT angio chest obtained and is negative for evidence of PE or dissection.  Questionable atypical pneumonia left lower lobe per radiology.  No symptoms of pneumonia clinically.    Given advanced age with acute onset of chest pain associate with nausea, significant hypertension and no previous invasive cardiac workup she is thought to be appropriate for hospitalization.    Additional sources:    - Discussed/ obtained information from independent historians:  daughter    - External (non-ED) record review: Cardiology note from August 2024 documenting history of diabetes, hyperlipidemia, hypertension.  Murmur on exam suspicious for aortic stenosis.    - Chronic or social conditions impacting care:  diabetes, hypertension    Shared Decision Making:  After my consideration of clinical presentation and any laboratory/radiology studies obtained, I discussed the findings with the patient/patient representative who is in agreement with  the treatment plan and the final disposition.   Risks and benefits of discharge and/or observation/admission were discussed.    Orders placed during this visit:  Orders Placed This Encounter   Procedures    XR Chest 1 View    CT Angiogram Chest    [place] Draw    Comprehensive Metabolic Panel    High Sensitivity Troponin T    CBC Auto Differential    High Sensitivity Troponin T 1Hr    NPO Diet NPO Type: Strict NPO    Undress & Gown    Continuous Pulse Oximetry    Oxygen Therapy- Nasal Cannula; Titrate 1-6 LPM Per SpO2; 90 - 95%    ECG 12 Lead ED Triage Standing Order; Chest Pain    Insert Peripheral IV    CBC & Differential    Green Top (Gel)    Lavender Top    Gold Top - SST    Light Blue Top             AS OF 07:13 EST VITALS:    BP - 151/84  HR - 68  TEMP - 97.5 °F (36.4 °C) (Oral)  O2 SATS - 93%                  DIAGNOSIS  Final diagnoses:   Chest pain, unspecified type         DISPOSITION  Admit      Please note that portions of this document were completed with voice recognition software.        Dougie Chong MD  12/31/24 [id number]

## 2024-12-31 NOTE — CONSULTS
"[facility]-Cardiology Consult Note    Referring Provider: Diane  Reason for Consultation: Chest pain    Patient Care Team:  Keysha Guerra DO as PCP - General (Family Medicine)    Chief complaint : Chest pain    Subjective:    History of present illness: This is a 74-year-old female patient who presents to the emergency room with onset of chest discomfort awakening her from sleep at 3 AM.  The patient described a pressure sensation in her central chest with severe pain across her shoulder blades.  In the emergency room her twelve-lead electrocardiogram showed sinus rhythm with a right bundle branch block and nonspecific T wave changes but no high risk ischemic ST segment deviation.  Her presenting systolic blood pressure was 213 mmHg.  She has a history of poorly controlled hypertension.  She reports compliance with her medications.  Cardiac troponins were mildly elevated.  CT scan of the chest showed no evidence of pulmonary embolus.  There was no evidence of aortic dissection.  She has a history of dyslipidemia, type 2 diabetes mellitus and chronic renal insufficiency.  She is a lifelong non-smoker.  She has no personal history of myocardial infarction or coronary revascularization.  She has a history of a \"heart murmur\".  She is followed by Dr. Pham as an outpatient.    Review of Systems   Review of Systems   Constitutional: Negative for chills, diaphoresis, fever, malaise/fatigue, weight gain and weight loss.   HENT:  Negative for ear discharge, hearing loss, hoarse voice and nosebleeds.    Eyes:  Negative for discharge, double vision, pain and photophobia.   Cardiovascular:  Positive for chest pain. Negative for claudication, cyanosis, dyspnea on exertion, irregular heartbeat, leg swelling, near-syncope, orthopnea, palpitations, paroxysmal nocturnal dyspnea and syncope.   Respiratory:  Negative for cough, hemoptysis, sputum production and wheezing.    Endocrine: Negative for cold intolerance, heat intolerance, " "polydipsia, polyphagia and polyuria.   Hematologic/Lymphatic: Negative for adenopathy and bleeding problem. Does not bruise/bleed easily.   Skin:  Negative for color change, flushing, itching and rash.   Musculoskeletal:  Negative for muscle cramps, muscle weakness, myalgias and stiffness.   Gastrointestinal:  Negative for abdominal pain, diarrhea, hematemesis, hematochezia, nausea and vomiting.   Genitourinary:  Negative for dysuria, frequency and nocturia.   Neurological:  Negative for focal weakness, loss of balance, numbness, paresthesias and seizures.   Psychiatric/Behavioral:  Negative for altered mental status, hallucinations and suicidal ideas.    Allergic/Immunologic: Negative for HIV exposure, hives and persistent infections.       History  Past Medical History:   Diagnosis Date    Cancer     Diabetes mellitus     GERD (gastroesophageal reflux disease)     Hyperlipidemia     Hypertension     Kidney disease     Murmur     Myocardial infarction    ,   Past Surgical History:   Procedure Laterality Date    HYSTERECTOMY      MASTECTOMY, PARTIAL     , History reviewed. No pertinent family history.,   Social History     Tobacco Use    Smoking status: Never    Smokeless tobacco: Never   Vaping Use    Vaping status: Never Used   Substance Use Topics    Alcohol use: Never    Drug use: Never   , (Not in a hospital admission)   and Allergies:  Patient has no known allergies.    Objective:    Vital Sign Min/Max for last 24 hours  Temp  Min: 97.5 °F (36.4 °C)  Max: 97.8 °F (36.6 °C)   BP  Min: 151/84  Max: 226/113   Pulse  Min: 67  Max: 71   Resp  Min: 16  Max: 18   SpO2  Min: 93 %  Max: 96 %   No data recorded   Weight  Min: 73.5 kg (162 lb)  Max: 73.5 kg (162 lb 0.6 oz)     Flowsheet Rows      Flowsheet Row First Filed Value   Admission Height 160 cm (63\") Documented at 12/31/2024 0403   Admission Weight 73.5 kg (162 lb) Documented at 12/31/2024 0403                 Physical Exam:   Vitals and nursing note reviewed. "   Constitutional:       Appearance: Healthy appearance. Not in distress.   Neck:      Vascular: No JVR. JVD normal.   Pulmonary:      Effort: Pulmonary effort is normal.      Breath sounds: Normal breath sounds. No wheezing. No rhonchi. No rales.   Chest:      Chest wall: Not tender to palpatation.   Cardiovascular:      PMI at left midclavicular line. Normal rate. Regular rhythm. Normal S1. Normal S2.       Murmurs: There is a grade 2/4 high frequency blowing decrescendo, early diastolic murmur at the URSB, radiating to the apex.      No gallop.  No click. No rub.   Pulses:     Intact distal pulses.   Edema:     Peripheral edema absent.   Abdominal:      General: Bowel sounds are normal.      Palpations: Abdomen is soft.      Tenderness: There is no abdominal tenderness.   Musculoskeletal: Normal range of motion.         General: No tenderness. Skin:     General: Skin is warm and dry.   Neurological:      General: No focal deficit present.      Mental Status: Alert and oriented to person, place and time.         Results Review:   I reviewed the patient's new clinical results.  Results from last 7 days   Lab Units 12/31/24  0418   WBC 10*3/mm3 2.92*   HEMOGLOBIN g/dL 12.4   HEMATOCRIT % 37.5   PLATELETS 10*3/mm3 203     Results from last 7 days   Lab Units 12/31/24  0418   SODIUM mmol/L 139   POTASSIUM mmol/L 4.1   CHLORIDE mmol/L 104   CO2 mmol/L 25.0   BUN mg/dL 21   CREATININE mg/dL 1.08*   GLUCOSE mg/dL 197*   CALCIUM mg/dL 9.9     Lab Results   Lab Value Date/Time    TROPONINT 16 (H) 12/31/2024 0527    TROPONINT 16 (H) 12/31/2024 0418             Assessment/Plan:      Other chest pain      I have recommended invasive coronary angiography with interventional standby.  Ms. Schuler has been engaged in a patient level discussion explaining the rationale for invasive testing.  The procedure of coronary angiography with catheter-based coronary revascularization has been explained in detail, using layman's terminology,  including risks, benefits and alternatives.  She expresses understanding and desire to proceed.  Further recommendations will be predicated on the results of her catheterization findings.  In the meantime, we will escalate her antihypertensive therapy.    I discussed the patient's findings and my recommendations with patient and family    Jono Monteiro MD  12/31/24  08:13 EST

## 2024-12-31 NOTE — ED NOTES
Report given to rebeca in cath lab but states they still have two cases ahead of the patient and will give a call when they are coming to get her.

## 2024-12-31 NOTE — PLAN OF CARE
Goal Outcome Evaluation:  Plan of Care Reviewed With: patient, child        Progress: improving  Outcome Evaluation: VSS on RA- BP Contolled- Pt. denies pain. TR band removed- Right femoral site C/D/I. Neurovascularly intact. Pt. ambulated to bathroom with assistance- requires slow position changes r/t Vertigo. IVF continued. POC ongoing.

## 2024-12-31 NOTE — Clinical Note
Level of Care: Telemetry [5]   Diagnosis: Other chest pain [786.59.ICD-9-CM]   Admitting Physician: BEA VALDERRAMA [[id number]]

## 2024-12-31 NOTE — H&P
[facility]      Name:  Catrachito Schuler   Age:  74 y.o.  Sex:  female  :  1950  MRN:  [id number]   Visit Number:  [id number]  Admission Date:  2024  Date Of Service:  24  Primary Care Physician:  Keysha Guerra DO    Chief Complaint:     Chest pain    History Of Presenting Illness:      Ms. Schuler is a 74-year-old female with pertinent past medical history of chronic kidney disease, diabetes mellitus type 2, chronic kidney disease, hypertension who presented to emergency department due to severe chest pain that began at 3 AM, awakening patient with associated nausea.  Patient currently lives with her .  Per daughter at bedside blood pressure has been very uncontrolled over the past couple of weeks with systolic in the 200s.  Patient has also had a cough over the past several days.  Denied fever or shortness of air.    Upon ED presentation patient blood pressure 226/113.  Pertinent labs and imaging noted procalcitonin 0.04, troponin 16 with repeat 16, creatinine 1.08, CTA of chest noted no PE evident with minimal patchy particularly left lower lobe groundglass densities may be incidental but cannot exclude minimal atypical pneumonia.  Hospitalist consulted for further medical management.  Cardiology consulted, patient taken to cardiac catheterization.  Fortunately patient noted to have angiographically normal coronary arteries.  Patient continued to be monitored on telemetry with home medications restarted.      Review Of Systems:    All systems were reviewed and negative except as mentioned in history of presenting illness, assessment and plan.    Past Medical History: Patient  has a past medical history of Cancer, Diabetes mellitus, GERD (gastroesophageal reflux disease), Hyperlipidemia, Hypertension, Kidney disease, Murmur, and Myocardial infarction.    Past Surgical History: Patient  has a past surgical history that includes  Hysterectomy and Mastectomy, partial.    Social History: Patient  reports that she has never smoked. She has never used smokeless tobacco. She reports that she does not drink alcohol and does not use drugs.    Family History:  Patient's family history has been reviewed and found to be noncontributory.     Allergies:      Patient has no known allergies.    Home Medications:    Prior to Admission Medications       Prescriptions Last Dose Informant Patient Reported? Taking?    alendronate (FOSAMAX) 70 MG tablet   Yes No    TAKE 1 TABLET BY MOUTH EVERY WEEK. TAKE WITH 6-8 OUNCES OF PLAIN WATER AT LEAST 30 MINUTES BEFORE FIRST FOOD, BEVERAGE, OR MEDICATION OF THE DAY    anastrozole (ARIMIDEX) 1 MG tablet   Yes No    Take 1 tablet by mouth Daily.    Aspirin (Vazalore) 81 MG capsule   Yes No    Take 1 capsule by mouth Daily.    carvedilol (COREG) 25 MG tablet   Yes No    Take 1 tablet by mouth Every 12 (Twelve) Hours.    famotidine (PEPCID) 20 MG tablet   Yes No    Take 1 tablet by mouth 2 (Two) Times a Day.    gabapentin (NEURONTIN) 400 MG capsule   Yes No    Take 1 capsule by mouth 4 (Four) Times a Day.    glimepiride (AMARYL) 4 MG tablet   Yes No    Take 1.5 tablets by mouth 2 (Two) Times a Day.    hydrALAZINE (APRESOLINE) 100 MG tablet   Yes No    Take 1 tablet by mouth 2 (Two) Times a Day.    HYDROcodone-acetaminophen (NORCO) 5-325 MG per tablet   Yes No    Take 1 tablet by mouth Every 12 (Twelve) Hours As Needed.    LORazepam (ATIVAN) 2 MG tablet   Yes No    Take 1 tablet by mouth every night at bedtime.    lovastatin (MEVACOR) 20 MG tablet   Yes No    Take 1 tablet by mouth Daily.    meclizine (ANTIVERT) 25 MG tablet   Yes No    Take 1 tablet by mouth 3 (Three) Times a Day As Needed.    metFORMIN (GLUCOPHAGE) 500 MG tablet   Yes No    Take 2 tablets by mouth 2 (Two) Times a Day With Meals.    Ozempic, 0.25 or 0.5 MG/DOSE, 2 MG/3ML solution pen-injector   Yes No    INJECT 0.25MG UNDER THE SKIN ONE TIME WEEKLY SAME  "DAY EACH WEEK    sertraline (ZOLOFT) 25 MG tablet   Yes No    Take 1 tablet by mouth Daily.          ED Medications:    Medications   sodium chloride 0.9 % flush 10 mL (has no administration in time range)   nitroglycerin (NITROSTAT) SL tablet 0.4 mg (0.4 mg Sublingual Given 12/31/24 0631)   aluminum-magnesium hydroxide-simethicone (MAALOX MAX) 400-400-40 MG/5ML 30 mL suspension ( Oral Given 12/31/24 0522)   ondansetron (ZOFRAN) injection 4 mg (4 mg Intravenous Given 12/31/24 0522)   iopamidol (ISOVUE-300) 61 % injection 100 mL (100 mL Intravenous Given 12/31/24 0514)   aspirin chewable tablet 324 mg (324 mg Oral Given 12/31/24 0629)     Vital Signs:  Temp:  [97.5 °F (36.4 °C)] 97.5 °F (36.4 °C)  Heart Rate:  [67-71] 68  Resp:  [18] 18  BP: (151-226)/() 151/84        12/31/24  0403   Weight: 73.5 kg (162 lb)     Body mass index is 28.7 kg/m².    Physical Exam:     Most recent vital Signs: /84   Pulse 68   Temp 97.5 °F (36.4 °C) (Oral)   Resp 18   Ht 160 cm (63\")   Wt 73.5 kg (162 lb)   SpO2 93%   BMI 28.70 kg/m²     Physical Exam  Vitals and nursing note reviewed.   Constitutional:       Comments: Chronically ill middle-aged female.   HENT:      Head: Normocephalic.      Nose: Nose normal.      Mouth/Throat:      Mouth: Mucous membranes are moist.   Eyes:      Pupils: Pupils are equal, round, and reactive to light.   Cardiovascular:      Rate and Rhythm: Normal rate and regular rhythm.      Pulses: Normal pulses.      Heart sounds: Murmur heard.   Pulmonary:      Effort: Pulmonary effort is normal.      Breath sounds: Normal breath sounds.      Comments: On room air.  Abdominal:      General: Bowel sounds are normal.      Palpations: Abdomen is soft.   Musculoskeletal:      Cervical back: Normal range of motion.      Comments: Reproducible chest pain noted epigastric area with palpation.   Skin:     General: Skin is warm.      Capillary Refill: Capillary refill takes less than 2 seconds. "   Neurological:      General: No focal deficit present.      Mental Status: She is alert and oriented to person, place, and time.   Psychiatric:         Mood and Affect: Mood normal.         Laboratory data:    I have reviewed the labs done in the emergency room.    Results from last 7 days   Lab Units 12/31/24  0418   SODIUM mmol/L 139   POTASSIUM mmol/L 4.1   CHLORIDE mmol/L 104   CO2 mmol/L 25.0   BUN mg/dL 21   CREATININE mg/dL 1.08*   CALCIUM mg/dL 9.9   BILIRUBIN mg/dL 0.2   ALK PHOS U/L 98   ALT (SGPT) U/L 17   AST (SGOT) U/L 22   GLUCOSE mg/dL 197*     Results from last 7 days   Lab Units 12/31/24  0418   WBC 10*3/mm3 2.92*   HEMOGLOBIN g/dL 12.4   HEMATOCRIT % 37.5   PLATELETS 10*3/mm3 203         Results from last 7 days   Lab Units 12/31/24  0527 12/31/24  0418   HSTROP T ng/L 16* 16*       EKG:      Sinus rhythm with right bundle branch block bpm 70    Radiology:    CT Angiogram Chest    Result Date: 12/31/2024  FINAL REPORT TECHNIQUE: null CLINICAL HISTORY: Chest pain w/ radiation to back, hypertensive (BP >200) COMPARISON: null FINDINGS: CTA chest Comparison: None provided. Findings: No pulmonary embolism evident. Thoracic aortic arteriosclerosis with 3.2 cm slight ectasia proximal descending portion. No dissection. Coronary artery arteriosclerosis. Minimal linear densities bilateral bases probably atelectasis/scarring. Minimal patchy particularly left lower lobe groundglass densities may be related to same but cannot exclude minimal atypical pneumonia. No airspace consolidation, pleural effusion, or pneumothorax. No pulmonary mass or adenopathy. Slight prominence esophageal wall but partially collapsed. Fecal retention. Cholecystectomy. Slight nodular thickening left-greater-than-right adrenal glands.     Impression: 1. No pulmonary embolism evident. 2. Minimal patchy particularly left lower lobe groundglass densities may be incidental but cannot exclude minimal atypical pneumonia. Authenticated and  Electronically Signed by Arleth Zamarripa MD on 12/31/2024 07:07:32 AM     Assessment:    Chest pain, likely musculoskeletal due to cough/hypertensive urgency, POA  Hypertensive urgency  Diabetes mellitus type 2  Depression/anxiety  Chronic kidney disease stage III      Plan:    -Patient taken for cardiac cath with normal coronary arteries.  -Chest pain likely secondary to hypertensive urgency/musculoskeletal/costochondritis with recent bronchitis.  -Continue aspirin and statin.  -Monitor blood pressure closely/: Continue on telemetry.  -Continue IV fluids until a.m. given underlying chronic kidney disease.  -Further orders pending clinical course.    Risk Assessment: High  DVT Prophylaxis: Heparin  Code Status: Full code  Diet: Cardiac    Advance Care Planning   ACP discussion was declined by the patient. Patient does not have an advance directive, declines further assistance.           Taty Mane, APRN  12/31/24  07:23 EST    Dictated utilizing Dragon dictation.

## 2025-01-01 VITALS
TEMPERATURE: 98.2 F | WEIGHT: 176.15 LBS | DIASTOLIC BLOOD PRESSURE: 61 MMHG | SYSTOLIC BLOOD PRESSURE: 114 MMHG | HEIGHT: 63 IN | BODY MASS INDEX: 31.21 KG/M2 | OXYGEN SATURATION: 90 % | HEART RATE: 62 BPM | RESPIRATION RATE: 18 BRPM

## 2025-01-01 LAB
ANION GAP SERPL CALCULATED.3IONS-SCNC: 12.3 MMOL/L (ref 5–15)
BASOPHILS # BLD AUTO: 0.03 10*3/MM3 (ref 0–0.2)
BASOPHILS NFR BLD AUTO: 0.7 % (ref 0–1.5)
BUN SERPL-MCNC: 21 MG/DL (ref 8–23)
BUN/CREAT SERPL: 18.6 (ref 7–25)
CALCIUM SPEC-SCNC: 8.6 MG/DL (ref 8.6–10.5)
CHLORIDE SERPL-SCNC: 104 MMOL/L (ref 98–107)
CHOLEST SERPL-MCNC: 155 MG/DL (ref 0–200)
CO2 SERPL-SCNC: 23.7 MMOL/L (ref 22–29)
CREAT SERPL-MCNC: 1.13 MG/DL (ref 0.57–1)
DEPRECATED RDW RBC AUTO: 50.9 FL (ref 37–54)
EGFRCR SERPLBLD CKD-EPI 2021: 50.5 ML/MIN/1.73
EOSINOPHIL # BLD AUTO: 0.2 10*3/MM3 (ref 0–0.4)
EOSINOPHIL NFR BLD AUTO: 4.5 % (ref 0.3–6.2)
ERYTHROCYTE [DISTWIDTH] IN BLOOD BY AUTOMATED COUNT: 14.3 % (ref 12.3–15.4)
GLUCOSE BLDC GLUCOMTR-MCNC: 121 MG/DL (ref 70–130)
GLUCOSE SERPL-MCNC: 111 MG/DL (ref 65–99)
HBA1C MFR BLD: 6.6 % (ref 4.8–5.6)
HCT VFR BLD AUTO: 34.9 % (ref 34–46.6)
HDLC SERPL-MCNC: 38 MG/DL (ref 40–60)
HGB BLD-MCNC: 11.1 G/DL (ref 12–15.9)
IMM GRANULOCYTES # BLD AUTO: 0.03 10*3/MM3 (ref 0–0.05)
IMM GRANULOCYTES NFR BLD AUTO: 0.7 % (ref 0–0.5)
LDLC SERPL CALC-MCNC: 88 MG/DL (ref 0–100)
LDLC/HDLC SERPL: 2.21 {RATIO}
LYMPHOCYTES # BLD AUTO: 1.13 10*3/MM3 (ref 0.7–3.1)
LYMPHOCYTES NFR BLD AUTO: 25.4 % (ref 19.6–45.3)
MCH RBC QN AUTO: 30.4 PG (ref 26.6–33)
MCHC RBC AUTO-ENTMCNC: 31.8 G/DL (ref 31.5–35.7)
MCV RBC AUTO: 95.6 FL (ref 79–97)
MONOCYTES # BLD AUTO: 0.36 10*3/MM3 (ref 0.1–0.9)
MONOCYTES NFR BLD AUTO: 8.1 % (ref 5–12)
NEUTROPHILS NFR BLD AUTO: 2.7 10*3/MM3 (ref 1.7–7)
NEUTROPHILS NFR BLD AUTO: 60.6 % (ref 42.7–76)
NRBC BLD AUTO-RTO: 0 /100 WBC (ref 0–0.2)
PLATELET # BLD AUTO: 173 10*3/MM3 (ref 140–450)
PMV BLD AUTO: 11 FL (ref 6–12)
POTASSIUM SERPL-SCNC: 4.5 MMOL/L (ref 3.5–5.2)
RBC # BLD AUTO: 3.65 10*6/MM3 (ref 3.77–5.28)
SODIUM SERPL-SCNC: 140 MMOL/L (ref 136–145)
TRIGL SERPL-MCNC: 165 MG/DL (ref 0–150)
VLDLC SERPL-MCNC: 29 MG/DL (ref 5–40)
WBC NRBC COR # BLD AUTO: 4.45 10*3/MM3 (ref 3.4–10.8)

## 2025-01-01 PROCEDURE — A9270 NON-COVERED ITEM OR SERVICE: HCPCS | Performed by: INTERNAL MEDICINE

## 2025-01-01 PROCEDURE — 63710000001 MECLIZINE 25 MG TABLET: Performed by: INTERNAL MEDICINE

## 2025-01-01 PROCEDURE — 99239 HOSP IP/OBS DSCHRG MGMT >30: CPT | Performed by: NURSE PRACTITIONER

## 2025-01-01 PROCEDURE — G0378 HOSPITAL OBSERVATION PER HR: HCPCS

## 2025-01-01 PROCEDURE — 80048 BASIC METABOLIC PNL TOTAL CA: CPT | Performed by: NURSE PRACTITIONER

## 2025-01-01 PROCEDURE — 82948 REAGENT STRIP/BLOOD GLUCOSE: CPT

## 2025-01-01 PROCEDURE — 63710000001 HYDROCODONE-ACETAMINOPHEN 5-325 MG TABLET: Performed by: INTERNAL MEDICINE

## 2025-01-01 PROCEDURE — 25010000002 HEPARIN (PORCINE) PER 1000 UNITS: Performed by: NURSE PRACTITIONER

## 2025-01-01 PROCEDURE — 85025 COMPLETE CBC W/AUTO DIFF WBC: CPT | Performed by: NURSE PRACTITIONER

## 2025-01-01 PROCEDURE — 83036 HEMOGLOBIN GLYCOSYLATED A1C: CPT | Performed by: NURSE PRACTITIONER

## 2025-01-01 PROCEDURE — 80061 LIPID PANEL: CPT | Performed by: NURSE PRACTITIONER

## 2025-01-01 RX ORDER — AMLODIPINE BESYLATE 10 MG/1
10 TABLET ORAL DAILY
Qty: 30 TABLET | Refills: 0 | Status: CANCELLED | OUTPATIENT
Start: 2025-01-01

## 2025-01-01 RX ORDER — CARVEDILOL 3.12 MG/1
3.12 TABLET ORAL EVERY 12 HOURS SCHEDULED
Qty: 60 TABLET | Refills: 0 | Status: SHIPPED | OUTPATIENT
Start: 2025-01-01 | End: 2025-01-07 | Stop reason: DRUGHIGH

## 2025-01-01 RX ORDER — CARVEDILOL 3.12 MG/1
3.12 TABLET ORAL EVERY 12 HOURS SCHEDULED
Status: DISCONTINUED | OUTPATIENT
Start: 2025-01-01 | End: 2025-01-01 | Stop reason: HOSPADM

## 2025-01-01 RX ORDER — ATORVASTATIN CALCIUM 80 MG/1
80 TABLET, FILM COATED ORAL NIGHTLY
Qty: 90 TABLET | Refills: 0 | Status: SHIPPED | OUTPATIENT
Start: 2025-01-01

## 2025-01-01 RX ORDER — GLIMEPIRIDE 4 MG/1
6 TABLET ORAL 2 TIMES DAILY
Start: 2025-01-01

## 2025-01-01 RX ADMIN — MECLIZINE HYDROCHLORIDE 25 MG: 25 TABLET ORAL at 06:23

## 2025-01-01 RX ADMIN — Medication 10 ML: at 09:11

## 2025-01-01 RX ADMIN — HEPARIN SODIUM 5000 UNITS: 5000 INJECTION, SOLUTION INTRAVENOUS; SUBCUTANEOUS at 09:11

## 2025-01-01 RX ADMIN — HYDROCODONE BITARTRATE AND ACETAMINOPHEN 1 TABLET: 5; 325 TABLET ORAL at 10:45

## 2025-01-01 NOTE — CASE MANAGEMENT/SOCIAL WORK
Case Management Discharge Note      Final Note: DC home with family.    Provided Post Acute Provider List?: N/A  N/A Provider List Comment: Patient plans to return home; no new needs  Provided Post Acute Provider Quality & Resource List?: N/A  N/A Quality & Resource List Comment: Patient plans to return home; no new needs    Selected Continued Care - Discharged on 1/1/2025 Admission date: 12/31/2024 - Discharge disposition: Home or Self Care      Destination    No services have been selected for the patient.                Durable Medical Equipment    No services have been selected for the patient.                Dialysis/Infusion    No services have been selected for the patient.                Home Medical Care    No services have been selected for the patient.                Therapy    No services have been selected for the patient.                Community Resources    No services have been selected for the patient.                Community & DME    No services have been selected for the patient.                    Transportation Services  Private: Car    Final Discharge Disposition Code: 01 - home or self-care

## 2025-01-01 NOTE — DISCHARGE SUMMARY
[facility]   DISCHARGE SUMMARY      Name:  Catrachito Schuler   Age:  74 y.o.  Sex:  female  :  1950  MRN:  [id number]   Visit Number:  [id number]    Admission Date:  2024  Date of Discharge:  2025  Primary Care Physician:  Keysha Guerra DO    Important issues to note:    -Patient mated for chest pain likely multifactorial due to questionable costochondritis from recent bronchitis/hypertensive urgency.  -Cardiac cath with normal coronary arteries.  -Continue aspirin and statin.  -Discontinue hydralazine.  -Coreg dose decreased due to soft blood pressure.  -Entresto added due to heart failure.  -Will need repeat BMP with PCP follow-up.  -Strict return precautions given.    Discharge Diagnoses:     Chest pain, likely musculoskeletal due to cough/hypertensive urgency, POA  Hypertensive urgency  Grade 1 diastolic dysfunction  Diabetes mellitus type 2  Depression/anxiety  Chronic kidney disease stage III    Problem List:     Active Hospital Problems    Diagnosis  POA    **Chest pain [R07.9]  Yes    Other chest pain [R07.89]  Yes      Resolved Hospital Problems   No resolved problems to display.     Presenting Problem:    Chief Complaint   Patient presents with    Chest Pain      Consults:     Consulting Physician(s)                     None              Procedures Performed:    Procedure(s):  Coronary angiography    History of presenting illness/Hospital Course:    Ms. Schulre is a 74-year-old female with pertinent past medical history of chronic kidney disease, diabetes mellitus type 2, chronic kidney disease, hypertension who presented to emergency department due to severe chest pain that began at 3 AM, awakening patient with associated nausea.  Patient currently lives with her .  Per daughter at bedside blood pressure has been very uncontrolled over the past couple of weeks with systolic in the 200s.  Patient has also had a cough over the past several days.   Denied fever or shortness of air.     Upon ED presentation patient blood pressure 226/113.  Pertinent labs and imaging noted procalcitonin 0.04, troponin 16 with repeat 16, creatinine 1.08, CTA of chest noted no PE evident with minimal patchy particularly left lower lobe groundglass densities may be incidental but cannot exclude minimal atypical pneumonia.  Hospitalist consulted for further medical management.  Cardiology consulted, patient taken to cardiac catheterization.  Fortunately patient noted to have angiographically normal coronary arteries.  Patient continued to be monitored on telemetry with home medications restarted.    Patient initiated on Entresto given preserved EF with grade 1 diastolic dysfunction noted.  Mild to moderate aortic stenosis noted.  She will continue aspirin and Lipitor.  Hydralazine discontinued as well as Coreg decreased.  Patient blood pressure much improved upon discharge.  Advised to continue Entresto and Coreg at this time for blood pressure control.  Can follow with PCP for repeat BMP in 1 week.  Advised to continue holding p.o. antidiabetic medications until Friday due to recent contrast dye.  No further chest pain noted.  Patient otherwise denies any concerns or complaints.  Reassuring labs and vitals noted.  Strict return precautions given.    Vital Signs:    Temp:  [97.7 °F (36.5 °C)-100 °F (37.8 °C)] 98.2 °F (36.8 °C)  Heart Rate:  [56-89] 62  Resp:  [13-20] 18  BP: (113-179)/(61-93) 114/61    Physical Exam:    General Appearance:  Alert and cooperative.  Chronically ill middle-age female.   Head:  Atraumatic and normocephalic.   Eyes: Conjunctivae and sclerae normal, no icterus. No pallor.   Ears:  Ears with no abnormalities noted.   Throat: No oral lesions, no thrush, oral mucosa moist.   Neck: Supple, trachea midline, no thyromegaly.   Back:   No kyphoscoliosis present. No tenderness to palpation.   Lungs:   Breath sounds heard bilaterally equally.  No crackles or  wheezing. No Pleural rub or bronchial breathing.  On room air unlabored.   Heart:  Normal S1 and S2, murmur, no gallop, no rub. No JVD.   Abdomen:   Normal bowel sounds, no masses, no organomegaly. Soft, nontender, nondistended, no rebound tenderness.   Extremities: Supple, no edema, no cyanosis, no clubbing.   Pulses: Pulses palpable bilaterally.   Skin: No bleeding or rash.   Neurologic: Alert and oriented x 3. No facial asymmetry. Moves all four limbs. No tremors.     Pertinent Lab Results:     Results from last 7 days   Lab Units 01/01/25  0705 12/31/24 0418   SODIUM mmol/L 140 139   POTASSIUM mmol/L 4.5 4.1   CHLORIDE mmol/L 104 104   CO2 mmol/L 23.7 25.0   BUN mg/dL 21 21   CREATININE mg/dL 1.13* 1.08*   CALCIUM mg/dL 8.6 9.9   BILIRUBIN mg/dL  --  0.2   ALK PHOS U/L  --  98   ALT (SGPT) U/L  --  17   AST (SGOT) U/L  --  22   GLUCOSE mg/dL 111* 197*     Results from last 7 days   Lab Units 01/01/25  0705 12/31/24 0418   WBC 10*3/mm3 4.45 2.92*   HEMOGLOBIN g/dL 11.1* 12.4   HEMATOCRIT % 34.9 37.5   PLATELETS 10*3/mm3 173 203         Results from last 7 days   Lab Units 12/31/24  0527 12/31/24 0418   HSTROP T ng/L 16* 16*                           Pertinent Radiology Results:    Imaging Results (All)       Procedure Component Value Units Date/Time    XR Chest 1 View [[id number]] Collected: 12/31/24 0734     Updated: 12/31/24 0737    Narrative:       PROCEDURE: XR CHEST 1 VW-     HISTORY: Chest Pain Triage Protocol, chest pain all over.     COMPARISON: None.     FINDINGS: The heart is normal in size. The mediastinum is unremarkable.  Decreased inspiratory effort noted with crowding of the lung markings in  both lung bases. No acute infiltrate noted.. There is no pneumothorax.   There are no acute osseous abnormalities. Apical lordotic positioning  noted.. Tortuosity of the thoracic aorta noted.       Impression:      No acute cardiopulmonary process.     .           This report was signed and finalized on  12/31/2024 7:35 AM by Sasha Zamora MD.       CT Angiogram Chest [[id number]] Collected: 12/31/24 0707     Updated: 12/31/24 0708    Narrative:      FINAL REPORT    TECHNIQUE:  null    CLINICAL HISTORY:  Chest pain w/ radiation to back, hypertensive (BP >200)    COMPARISON:  null    FINDINGS:  CTA chest    Comparison: None provided.    Findings:    No pulmonary embolism evident. Thoracic aortic arteriosclerosis with 3.2 cm slight ectasia proximal descending portion. No dissection. Coronary artery arteriosclerosis. Minimal linear densities bilateral bases probably atelectasis/scarring. Minimal   patchy particularly left lower lobe groundglass densities may be related to same but cannot exclude minimal atypical pneumonia. No airspace consolidation, pleural effusion, or pneumothorax. No pulmonary mass or adenopathy. Slight prominence esophageal   wall but partially collapsed. Fecal retention. Cholecystectomy. Slight nodular thickening left-greater-than-right adrenal glands.      Impression:      Impression:    1. No pulmonary embolism evident.    2. Minimal patchy particularly left lower lobe groundglass densities may be incidental but cannot exclude minimal atypical pneumonia.    Authenticated and Electronically Signed by Arleth Zamarripa MD on  12/31/2024 07:07:32 AM            Echo:    Results for orders placed during the hospital encounter of 12/31/24    Adult Transthoracic Echo Complete W/ Cont if Necessary Per Protocol    Interpretation Summary    Left ventricular systolic function is normal. Calculated left ventricular EF = 63.5% Left ventricular ejection fraction appears to be 61 - 65%.    Left ventricular wall thickness is consistent with mild concentric hypertrophy.    Left ventricular diastolic function is consistent with (grade I) impaired relaxation.    The left atrial cavity is moderate to severely dilated.    Left atrial volume is mildly increased.    The right atrial cavity is borderline dilated.    Mild  to moderate aortic valve stenosis is present.    Condition on Discharge:      Stable.    Code status during the hospital stay:    Code Status and Medical Interventions: CPR (Attempt to Resuscitate); Full Support   Ordered at: 12/31/24 1223     Level Of Support Discussed With:    Patient     Code Status (Patient has no pulse and is not breathing):    CPR (Attempt to Resuscitate)     Medical Interventions (Patient has pulse or is breathing):    Full Support     Discharge Disposition:    Home or Self Care    Discharge Medications:       Discharge Medications        New Medications        Instructions Start Date   atorvastatin 80 MG tablet  Commonly known as: LIPITOR   80 mg, Oral, Nightly      sacubitril-valsartan 24-26 MG tablet  Commonly known as: ENTRESTO   1 tablet, Oral, Every 12 Hours Scheduled             Changes to Medications        Instructions Start Date   carvedilol 3.125 MG tablet  Commonly known as: COREG  What changed:   medication strength  how much to take  when to take this   3.125 mg, Oral, Every 12 Hours Scheduled      glimepiride 4 MG tablet  Commonly known as: AMARYL  What changed: additional instructions   6 mg, Oral, 2 Times Daily, Hold until Friday due to recent contrast.      metFORMIN 500 MG tablet  Commonly known as: GLUCOPHAGE  What changed: additional instructions   1,000 mg, Oral, 2 Times Daily With Meals, Hold until Friday due to recent contrast.             Continue These Medications        Instructions Start Date   alendronate 70 MG tablet  Commonly known as: FOSAMAX   TAKE 1 TABLET BY MOUTH EVERY WEEK. TAKE WITH 6-8 OUNCES OF PLAIN WATER AT LEAST 30 MINUTES BEFORE FIRST FOOD, BEVERAGE, OR MEDICATION OF THE DAY      famotidine 20 MG tablet  Commonly known as: PEPCID   1 tablet, Oral, 2 Times Daily      gabapentin 400 MG capsule  Commonly known as: NEURONTIN   400 mg, Oral, 4 Times Daily      HYDROcodone-acetaminophen 5-325 MG per tablet  Commonly known as: NORCO   1 tablet, Oral, Every  12 Hours PRN      LORazepam 2 MG tablet  Commonly known as: ATIVAN   2 mg, Oral, Every Night at Bedtime      meclizine 25 MG tablet  Commonly known as: ANTIVERT   25 mg, Oral, 3 Times Daily PRN      Ozempic (0.25 or 0.5 MG/DOSE) 2 MG/3ML solution pen-injector  Generic drug: Semaglutide(0.25 or 0.5MG/DOS)   INJECT 0.25MG UNDER THE SKIN ONE TIME WEEKLY SAME DAY EACH WEEK      Vazalore 81 MG capsule  Generic drug: Aspirin   1 capsule, Oral, Daily             Stop These Medications      anastrozole 1 MG tablet  Commonly known as: ARIMIDEX     hydrALAZINE 100 MG tablet  Commonly known as: APRESOLINE     lovastatin 20 MG tablet  Commonly known as: MEVACOR     sertraline 25 MG tablet  Commonly known as: ZOLOFT            Discharge Diet:     Diet Instructions       Diet: Cardiac Diets, Diabetic Diets; Healthy Heart (2-3 Na+); Thin (IDDSI 0); Consistent Carbohydrate      Discharge Diet:  Cardiac Diets  Diabetic Diets       Cardiac Diet: Healthy Heart (2-3 Na+)    Fluid Consistency: Thin (IDDSI 0)    Diabetic Diet: Consistent Carbohydrate          Activity at Discharge:     Activity Instructions       Activity as Tolerated            Follow-up Appointments:     Follow-up Information       Keysha Guerra DO. Call in 1 week(s).    Specialty: Family Medicine  Why: Please call to schedule hoptal follow-up appointment.  Contact information:  [address] [place], [place] [id number] [phone]                           No future appointments.  Test Results Pending at Discharge:    Pending Results       None                 FREDDY Weaver  01/01/25  10:44 EST    Time: I spent >30 minutes on this discharge activity which included: face-to-face encounter with the patient, reviewing the data in the system, coordination of the care with the nursing staff as well as consultants, documentation, and entering orders.     Dictated utilizing Dragon dictation.

## 2025-01-01 NOTE — DISCHARGE INSTRUCTIONS
Patient to be discharged home.  Follow with PCP in 1 week.  Monitor blood pressure closely.  Hold Hydralazine for now.  Coreg decreased.  Started Entresto for heart failure.  Return to emergency department for any worsening symptoms.

## 2025-01-01 NOTE — PLAN OF CARE
Goal Outcome Evaluation:  Plan of Care Reviewed With: patient        Progress: improving  Outcome Evaluation: VSS, rested well, BP improved, maintaining o2 sats >90% on RA, denied c/o pain

## 2025-01-03 ENCOUNTER — APPOINTMENT (OUTPATIENT)
Dept: GENERAL RADIOLOGY | Facility: HOSPITAL | Age: 75
End: 2025-01-03
Payer: MEDICARE

## 2025-01-03 ENCOUNTER — HOSPITAL ENCOUNTER (EMERGENCY)
Facility: HOSPITAL | Age: 75
Discharge: HOME | End: 2025-01-03
Attending: STUDENT IN AN ORGANIZED HEALTH CARE EDUCATION/TRAINING PROGRAM
Payer: MEDICARE

## 2025-01-03 VITALS
WEIGHT: 176.4 LBS | HEART RATE: 67 BPM | OXYGEN SATURATION: 96 % | TEMPERATURE: 97.8 F | HEIGHT: 63 IN | DIASTOLIC BLOOD PRESSURE: 89 MMHG | RESPIRATION RATE: 18 BRPM | SYSTOLIC BLOOD PRESSURE: 166 MMHG | BODY MASS INDEX: 31.25 KG/M2

## 2025-01-03 DIAGNOSIS — R03.0 ELEVATED BLOOD PRESSURE READING: Primary | ICD-10-CM

## 2025-01-03 LAB
ALBUMIN SERPL-MCNC: 3.8 G/DL (ref 3.5–5.2)
ALBUMIN/GLOB SERPL: 1.3 G/DL
ALP SERPL-CCNC: 70 U/L (ref 39–117)
ALT SERPL W P-5'-P-CCNC: 13 U/L (ref 1–33)
ANION GAP SERPL CALCULATED.3IONS-SCNC: 9.4 MMOL/L (ref 5–15)
AST SERPL-CCNC: 18 U/L (ref 1–32)
BASOPHILS # BLD AUTO: 0.03 10*3/MM3 (ref 0–0.2)
BASOPHILS NFR BLD AUTO: 1.1 % (ref 0–1.5)
BILIRUB SERPL-MCNC: 0.3 MG/DL (ref 0–1.2)
BUN SERPL-MCNC: 19 MG/DL (ref 8–23)
BUN/CREAT SERPL: 19.4 (ref 7–25)
CALCIUM SPEC-SCNC: 9 MG/DL (ref 8.6–10.5)
CHLORIDE SERPL-SCNC: 106 MMOL/L (ref 98–107)
CO2 SERPL-SCNC: 24.6 MMOL/L (ref 22–29)
CREAT SERPL-MCNC: 0.98 MG/DL (ref 0.57–1)
DEPRECATED RDW RBC AUTO: 48.8 FL (ref 37–54)
EGFRCR SERPLBLD CKD-EPI 2021: 59.9 ML/MIN/1.73
EOSINOPHIL # BLD AUTO: 0.17 10*3/MM3 (ref 0–0.4)
EOSINOPHIL NFR BLD AUTO: 6.5 % (ref 0.3–6.2)
ERYTHROCYTE [DISTWIDTH] IN BLOOD BY AUTOMATED COUNT: 14 % (ref 12.3–15.4)
GEN 5 1HR TROPONIN T REFLEX: 16 NG/L
GLOBULIN UR ELPH-MCNC: 3 GM/DL
GLUCOSE SERPL-MCNC: 119 MG/DL (ref 65–99)
HCT VFR BLD AUTO: 34 % (ref 34–46.6)
HGB BLD-MCNC: 11 G/DL (ref 12–15.9)
IMM GRANULOCYTES # BLD AUTO: 0.04 10*3/MM3 (ref 0–0.05)
IMM GRANULOCYTES NFR BLD AUTO: 1.5 % (ref 0–0.5)
LYMPHOCYTES # BLD AUTO: 0.97 10*3/MM3 (ref 0.7–3.1)
LYMPHOCYTES NFR BLD AUTO: 37.2 % (ref 19.6–45.3)
MCH RBC QN AUTO: 30.5 PG (ref 26.6–33)
MCHC RBC AUTO-ENTMCNC: 32.4 G/DL (ref 31.5–35.7)
MCV RBC AUTO: 94.2 FL (ref 79–97)
MONOCYTES # BLD AUTO: 0.28 10*3/MM3 (ref 0.1–0.9)
MONOCYTES NFR BLD AUTO: 10.7 % (ref 5–12)
NEUTROPHILS NFR BLD AUTO: 1.12 10*3/MM3 (ref 1.7–7)
NEUTROPHILS NFR BLD AUTO: 43 % (ref 42.7–76)
NRBC BLD AUTO-RTO: 0 /100 WBC (ref 0–0.2)
PLATELET # BLD AUTO: 181 10*3/MM3 (ref 140–450)
PMV BLD AUTO: 10.5 FL (ref 6–12)
POTASSIUM SERPL-SCNC: 4.3 MMOL/L (ref 3.5–5.2)
PROT SERPL-MCNC: 6.8 G/DL (ref 6–8.5)
RBC # BLD AUTO: 3.61 10*6/MM3 (ref 3.77–5.28)
SODIUM SERPL-SCNC: 140 MMOL/L (ref 136–145)
TROPONIN T % DELTA: 0 %
TROPONIN T NUMERIC DELTA: 0 NG/L
TROPONIN T SERPL HS-MCNC: 16 NG/L
WBC NRBC COR # BLD AUTO: 2.61 10*3/MM3 (ref 3.4–10.8)

## 2025-01-03 PROCEDURE — 85025 COMPLETE CBC W/AUTO DIFF WBC: CPT

## 2025-01-03 PROCEDURE — 84484 ASSAY OF TROPONIN QUANT: CPT

## 2025-01-03 PROCEDURE — 93005 ELECTROCARDIOGRAM TRACING: CPT

## 2025-01-03 PROCEDURE — 36415 COLL VENOUS BLD VENIPUNCTURE: CPT

## 2025-01-03 PROCEDURE — 80053 COMPREHEN METABOLIC PANEL: CPT

## 2025-01-03 PROCEDURE — 71045 X-RAY EXAM CHEST 1 VIEW: CPT

## 2025-01-03 PROCEDURE — 25010000002 HYDRALAZINE PER 20 MG

## 2025-01-03 PROCEDURE — 99284 EMERGENCY DEPT VISIT MOD MDM: CPT | Performed by: STUDENT IN AN ORGANIZED HEALTH CARE EDUCATION/TRAINING PROGRAM

## 2025-01-03 PROCEDURE — 96374 THER/PROPH/DIAG INJ IV PUSH: CPT

## 2025-01-03 RX ORDER — HYDRALAZINE HYDROCHLORIDE 20 MG/ML
10 INJECTION INTRAMUSCULAR; INTRAVENOUS ONCE
Status: COMPLETED | OUTPATIENT
Start: 2025-01-03 | End: 2025-01-03

## 2025-01-03 RX ORDER — LIDOCAINE HYDROCHLORIDE 20 MG/ML
10 SOLUTION OROPHARYNGEAL ONCE
Status: COMPLETED | OUTPATIENT
Start: 2025-01-03 | End: 2025-01-03

## 2025-01-03 RX ORDER — SUCRALFATE 1 G/1
1 TABLET ORAL ONCE
Status: COMPLETED | OUTPATIENT
Start: 2025-01-03 | End: 2025-01-03

## 2025-01-03 RX ADMIN — SUCRALFATE 1 G: 1 TABLET ORAL at 14:45

## 2025-01-03 RX ADMIN — HYDRALAZINE HYDROCHLORIDE 10 MG: 20 INJECTION INTRAMUSCULAR; INTRAVENOUS at 13:11

## 2025-01-03 RX ADMIN — ALUMINUM HYDROXIDE, MAGNESIUM HYDROXIDE, AND DIMETHICONE: 400; 400; 40 SUSPENSION ORAL at 14:44

## 2025-01-03 RX ADMIN — LIDOCAINE HYDROCHLORIDE 10 ML: 20 SOLUTION ORAL at 14:44

## 2025-01-03 NOTE — ED PROVIDER NOTES
EMERGENCY DEPARTMENT ENCOUNTER    Pt Name: Catrachito Schuler  MRN: [id number]  Pt :   1950  Room Number:    Date of encounter:  1/3/2025  PCP: Keysha Guerra DO  ED Provider: John Monzon PA-C    Historian: Patient, nursing notes      HPI:  Chief Complaint: high blood pressure        Context: Catrachito Schuler is a 74 y.o. female who presents to the ED c/o elevated blood pressure readings at home.  Patient states over the last 24 hours she has had multiple high blood pressure readings that are not responding well to her current blood pressure medication.  Patient states other than some mild neck discomfort she has no chest pain no shortness of breath or any other acute symptoms at this time.      PAST MEDICAL HISTORY  Past Medical History:   Diagnosis Date    Cancer     Diabetes mellitus     GERD (gastroesophageal reflux disease)     Hyperlipidemia     Hypertension     Kidney disease     Murmur     Myocardial infarction          PAST SURGICAL HISTORY  Past Surgical History:   Procedure Laterality Date    CARDIAC CATHETERIZATION N/A 2024    Procedure: Coronary angiography;  Surgeon: Jono Monteiro MD;  Location: [facility] INVASIVE LOCATION;  Service: Cardiology;  Laterality: N/A;    HYSTERECTOMY      MASTECTOMY, PARTIAL           FAMILY HISTORY  No family history on file.      SOCIAL HISTORY  Social History     Socioeconomic History    Marital status:    Tobacco Use    Smoking status: Never    Smokeless tobacco: Never   Vaping Use    Vaping status: Never Used   Substance and Sexual Activity    Alcohol use: Never    Drug use: Never    Sexual activity: Defer         ALLERGIES  Patient has no known allergies.        REVIEW OF SYSTEMS  Review of Systems   Constitutional:  Negative for chills and fever.   HENT:  Negative for congestion and sore throat.    Respiratory:  Negative for cough and shortness of breath.    Cardiovascular:  Negative for chest pain.   Gastrointestinal:   Negative for abdominal pain, nausea and vomiting.   Genitourinary:  Negative for dysuria.   Musculoskeletal:  Negative for back pain.   Skin:  Negative for wound.   Neurological:  Negative for dizziness and headaches.   Psychiatric/Behavioral:  Negative for confusion.    All other systems reviewed and are negative.         All systems reviewed and negative except for those discussed in HPI.       PHYSICAL EXAM    I have reviewed the triage vital signs and nursing notes.    ED Triage Vitals [01/03/25 1212]   Temp Heart Rate Resp BP SpO2   97.8 °F (36.6 °C) 60 18 (!) 201/98 97 %      Temp src Heart Rate Source Patient Position BP Location FiO2 (%)   Oral Monitor Sitting Left arm --       Physical Exam  Vitals and nursing note reviewed.   Constitutional:       General: She is not in acute distress.     Appearance: She is not ill-appearing, toxic-appearing or diaphoretic.   HENT:      Head: Normocephalic and atraumatic.      Mouth/Throat:      Mouth: Mucous membranes are moist.      Pharynx: Oropharynx is clear.   Eyes:      Extraocular Movements: Extraocular movements intact.   Cardiovascular:      Rate and Rhythm: Normal rate.      Heart sounds: Normal heart sounds.   Pulmonary:      Effort: Pulmonary effort is normal. No respiratory distress.      Breath sounds: Normal breath sounds.   Abdominal:      Tenderness: There is no abdominal tenderness.   Skin:     General: Skin is warm and dry.      Findings: No rash.   Neurological:      Mental Status: She is alert.             LAB RESULTS  Recent Results (from the past 24 hours)   Comprehensive Metabolic Panel    Collection Time: 01/03/25  1:02 PM    Specimen: Blood   Result Value Ref Range    Glucose 119 (H) 65 - 99 mg/dL    BUN 19 8 - 23 mg/dL    Creatinine 0.98 0.57 - 1.00 mg/dL    Sodium 140 136 - 145 mmol/L    Potassium 4.3 3.5 - 5.2 mmol/L    Chloride 106 98 - 107 mmol/L    CO2 24.6 22.0 - 29.0 mmol/L    Calcium 9.0 8.6 - 10.5 mg/dL    Total Protein 6.8 6.0 - 8.5  g/dL    Albumin 3.8 3.5 - 5.2 g/dL    ALT (SGPT) 13 1 - 33 U/L    AST (SGOT) 18 1 - 32 U/L    Alkaline Phosphatase 70 39 - 117 U/L    Total Bilirubin 0.3 0.0 - 1.2 mg/dL    Globulin 3.0 gm/dL    A/G Ratio 1.3 g/dL    BUN/Creatinine Ratio 19.4 7.0 - 25.0    Anion Gap 9.4 5.0 - 15.0 mmol/L    eGFR 59.9 (L) >60.0 mL/min/1.73   High Sensitivity Troponin T    Collection Time: 01/03/25  1:02 PM    Specimen: Blood   Result Value Ref Range    HS Troponin T 16 (H) <14 ng/L   CBC Auto Differential    Collection Time: 01/03/25  1:02 PM    Specimen: Blood   Result Value Ref Range    WBC 2.61 (L) 3.40 - 10.80 10*3/mm3    RBC 3.61 (L) 3.77 - 5.28 10*6/mm3    Hemoglobin 11.0 (L) 12.0 - 15.9 g/dL    Hematocrit 34.0 34.0 - 46.6 %    MCV 94.2 79.0 - 97.0 fL    MCH 30.5 26.6 - 33.0 pg    MCHC 32.4 31.5 - 35.7 g/dL    RDW 14.0 12.3 - 15.4 %    RDW-SD 48.8 37.0 - 54.0 fl    MPV 10.5 6.0 - 12.0 fL    Platelets 181 140 - 450 10*3/mm3    Neutrophil % 43.0 42.7 - 76.0 %    Lymphocyte % 37.2 19.6 - 45.3 %    Monocyte % 10.7 5.0 - 12.0 %    Eosinophil % 6.5 (H) 0.3 - 6.2 %    Basophil % 1.1 0.0 - 1.5 %    Immature Grans % 1.5 (H) 0.0 - 0.5 %    Neutrophils, Absolute 1.12 (L) 1.70 - 7.00 10*3/mm3    Lymphocytes, Absolute 0.97 0.70 - 3.10 10*3/mm3    Monocytes, Absolute 0.28 0.10 - 0.90 10*3/mm3    Eosinophils, Absolute 0.17 0.00 - 0.40 10*3/mm3    Basophils, Absolute 0.03 0.00 - 0.20 10*3/mm3    Immature Grans, Absolute 0.04 0.00 - 0.05 10*3/mm3    nRBC 0.0 0.0 - 0.2 /100 WBC   High Sensitivity Troponin T 1Hr    Collection Time: 01/03/25  2:06 PM    Specimen: Blood   Result Value Ref Range    HS Troponin T 16 (H) <14 ng/L    Troponin T Numeric Delta 0 ng/L    Troponin T % Delta 0 Abnormal if >/= 20% %       If labs were ordered, I independently reviewed the results and considered them in treating the patient.        RADIOLOGY  XR Chest 1 View    Result Date: 1/3/2025  PROCEDURE: XR CHEST 1 VW-  HISTORY: HTN  COMPARISON: 12/31/2024.  FINDINGS:  Apical lordotic positioning. The heart is normal in size. The mediastinum is unremarkable. The lungs are clear. There is no pneumothorax.  There are no acute osseous abnormalities.      No acute cardiopulmonary process.    Images were reviewed, interpreted, and dictated by Dr. Sasha Zamora MD Transcribed by Dion Mccallum PA-C.  This report was signed and finalized on 1/3/2025 1:28 PM by Sasha Zamora MD.       I ordered and independently reviewed the above noted radiographic studies.      I viewed images of chest x-ray which showed no acute process per my independent interpretation.    See radiologist's dictation for official interpretation.        PROCEDURES    Procedures    ECG 12 Lead Chest Pain   Final Result          MEDICATIONS GIVEN IN ER    Medications   hydrALAZINE (APRESOLINE) injection 10 mg (10 mg Intravenous Given 1/3/25 1311)   aluminum-magnesium hydroxide-simethicone (MAALOX MAX) 400-400-40 MG/5ML 30 mL suspension ( Oral Given 1/3/25 1444)   Lidocaine Viscous HCl (XYLOCAINE) 2 % solution 10 mL (10 mL Mouth/Throat Given 1/3/25 1444)   sucralfate (CARAFATE) tablet 1 g (1 g Oral Given 1/3/25 1445)         MEDICAL DECISION MAKING, PROGRESS, and CONSULTS    All labs, if obtained, have been independently reviewed by me.  All radiology studies, if obtained, have been reviewed by me and the radiologist dictating the report.  All EKG's, if obtained, have been independently viewed and interpreted by me/my attending physician.      Discussion below represents my analysis of pertinent findings related to patient's condition, differential diagnosis, treatment plan and final disposition.    74-year-old female presented for evaluation of elevated blood pressure readings at home and intermittent chest pains.  Today she is upright alert oriented no acute distress sitting comfortably in the exam chair.  On arrival she is hypertensive at 201/98 hydralazine was given to address blood pressure and GI cocktail given to  address possible gastritis or gastric cause of chest pain.  Laboratory studies today were unremarkable and near patient's baseline 2 troponins were flat at 16 which was her left same level from 3 days prior.  I reviewed charts showing the patient had a normal EF on recent echo last week and a cardiac cath with no significant vessel disease.  Given this reassuring cardiac workup she is already had today's levels I did not have any acute suspicion for acute coronary syndrome at this time and felt the patient was stable for discharge.  Her blood pressure did normalize to 151/76 prior to discharge and she has multiple medications to address pressure at home.  Close follow-up with cardiologist recommended along with PCP and patient verbalized understanding of and agreement today's plan of care.                     Differential diagnosis:    Differential diagnosis included but was not limited to ACS, asymptomatic hypertension, essential hypertension, anxiety, gastritis, GERD      Additional sources:    - Discussed/ obtained information from independent historians: Patient's daughter at bedside in addition to patient    - External (non-ED) record review: Cardiology records reviewed by me.  Patient's recent echocardiogram results reviewed by me showing a normal EF, I reviewed the cardiac cath notes from Dr. Monteiro from last week showing patient had no significant vessel disease    - Chronic or social conditions impacting care: None    Orders placed during this visit:  Orders Placed This Encounter   Procedures    XR Chest 1 View    Comprehensive Metabolic Panel    High Sensitivity Troponin T    CBC Auto Differential    High Sensitivity Troponin T 1Hr    Ambulatory Referral to Family Practice    ECG 12 Lead Chest Pain    CBC & Differential         Additional orders considered but not ordered: None      ED Course:    Consultants: None    ED Course as of 01/03/25 1747   Fri Jan 03, 2025   1323 WBC(!): 2.61 [TG]      ED Course  User Index  [TG] John Monzon PA-C              Shared Decision Making:  After my consideration of clinical presentation and any laboratory/radiology studies obtained, I discussed the findings with the patient/patient representative who is in agreement with the treatment plan and the final disposition.   Risks and benefits of discharge and/or observation/admission were discussed.       AS OF 17:47 EST VITALS:    BP - 166/89  HR - 67  TEMP - 97.8 °F (36.6 °C) (Oral)  O2 SATS - 96%                  DIAGNOSIS  Final diagnoses:   Elevated blood pressure reading         DISPOSITION  Discharge home      Please note that portions of this document were completed with voice recognition software.      John Monzon PA-C  01/03/25 9004

## 2025-01-03 NOTE — DISCHARGE INSTRUCTIONS
We recommend taking her blood pressure medications as prescribed and close follow-up with your primary care provider within 3 to 4 days.  Discussed with your primary care provider whether you will benefit from further evaluation with cardiologist.  Return to the ER for any acute changes or worsening of your condition.

## 2025-01-07 ENCOUNTER — OFFICE VISIT (OUTPATIENT)
Dept: CARDIOLOGY | Facility: CLINIC | Age: 75
End: 2025-01-07
Payer: MEDICARE

## 2025-01-07 VITALS
SYSTOLIC BLOOD PRESSURE: 140 MMHG | BODY MASS INDEX: 31.18 KG/M2 | HEIGHT: 63 IN | DIASTOLIC BLOOD PRESSURE: 90 MMHG | WEIGHT: 176 LBS | OXYGEN SATURATION: 95 % | HEART RATE: 71 BPM

## 2025-01-07 DIAGNOSIS — R07.2 PRECORDIAL PAIN: Primary | ICD-10-CM

## 2025-01-07 DIAGNOSIS — I35.0 AORTIC STENOSIS, MILD: ICD-10-CM

## 2025-01-07 DIAGNOSIS — I10 PRIMARY HYPERTENSION: ICD-10-CM

## 2025-01-07 DIAGNOSIS — Q24.5 ANOMALOUS CORONARY ARTERY ORIGIN: ICD-10-CM

## 2025-01-07 PROCEDURE — 1159F MED LIST DOCD IN RCRD: CPT | Performed by: INTERNAL MEDICINE

## 2025-01-07 PROCEDURE — 3080F DIAST BP >= 90 MM HG: CPT | Performed by: INTERNAL MEDICINE

## 2025-01-07 PROCEDURE — 3077F SYST BP >= 140 MM HG: CPT | Performed by: INTERNAL MEDICINE

## 2025-01-07 PROCEDURE — 1160F RVW MEDS BY RX/DR IN RCRD: CPT | Performed by: INTERNAL MEDICINE

## 2025-01-07 PROCEDURE — 99213 OFFICE O/P EST LOW 20 MIN: CPT | Performed by: INTERNAL MEDICINE

## 2025-01-07 RX ORDER — LORAZEPAM 1 MG/1
1 TABLET ORAL EVERY 12 HOURS SCHEDULED
COMMUNITY
Start: 2024-11-08

## 2025-01-07 RX ORDER — CLONIDINE HYDROCHLORIDE 0.1 MG/1
0.1 TABLET ORAL
Qty: 100 TABLET | Refills: 3 | Status: SHIPPED | OUTPATIENT
Start: 2025-01-07

## 2025-01-07 RX ORDER — ISOSORBIDE MONONITRATE 30 MG/1
30 TABLET, EXTENDED RELEASE ORAL EVERY MORNING
Qty: 90 TABLET | Refills: 3 | Status: SHIPPED | OUTPATIENT
Start: 2025-01-07

## 2025-01-07 RX ORDER — CARVEDILOL 6.25 MG/1
6.25 TABLET ORAL 2 TIMES DAILY
Qty: 180 TABLET | Refills: 3 | Status: SHIPPED | OUTPATIENT
Start: 2025-01-07

## 2025-01-07 RX ORDER — AMLODIPINE BESYLATE 10 MG/1
10 TABLET ORAL DAILY
Qty: 90 TABLET | Refills: 3 | Status: SHIPPED | OUTPATIENT
Start: 2025-01-07

## 2025-01-07 NOTE — PROGRESS NOTES
"    Subjective:     Encounter Date:01/07/2025      Patient ID: Catrachito Schuler is a 74 y.o. female.    Chief Complaint: Chest pain  HPI  This is a 74-year-old female patient who presents to cardiology clinic in follow-up to recent hospitalization for atypical chest pain.  The patient was awakened from sleep on the morning of presentation with severe chest discomfort.  Cardiac troponins were elevated in the context of severe hypertension with systolic blood pressures greater than 200 mmHg.  The patient has a longstanding history of poorly controlled hypertension.  The patient had an echocardiogram performed showing preserved global and regional LV systolic function.  There was mild concentric left ventricular hypertrophy with evidence of diastolic dysfunction.  She was noted to have mild to moderate senile calcific valvular aortic stenosis which was first diagnosed by echocardiogram several years ago in [facility] by Dr. Potts.  The patient underwent invasive coronary angiography from a right femoral approach (radial approach was not possible due to a 360 degree brachial loop).  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  Angiographically, this appeared to have a course anterior to the main pulmonary artery but could not be completely confirmed to not have a \"inter-arterial\" course.  The patient has subsequently had an additional visit to the emergency room for recurrent chest discomfort and hypertension.  Unfortunately, the patient did not get any of the medications that I recommended while hospitalized prescribed for her in follow-up.  The patient's daughter indicates they were supposed to have a \"Meds-to-beds\" pharmacy visit to ensure that she was going home with the medications that I had recommended.  For unknown reasons, this did not occur.  " "She is a non-smoker.  She describes having a \"band-like\" squeezing discomfort across her lower chest which usually occurs at rest and is invariably associated with worsening hypertension.  The following portions of the patient's history were reviewed and updated as appropriate: allergies, current medications, past family history, past medical history, past social history, past surgical history and problem  Review of Systems   Constitutional: Negative for chills, diaphoresis, fever, malaise/fatigue, weight gain and weight loss.   HENT:  Negative for ear discharge, hearing loss, hoarse voice and nosebleeds.    Eyes:  Negative for discharge, double vision, pain and photophobia.   Cardiovascular:  Positive for chest pain. Negative for claudication, cyanosis, dyspnea on exertion, irregular heartbeat, leg swelling, near-syncope, orthopnea, palpitations, paroxysmal nocturnal dyspnea and syncope.   Respiratory:  Negative for cough, hemoptysis, sputum production and wheezing.    Endocrine: Negative for cold intolerance, heat intolerance, polydipsia, polyphagia and polyuria.   Hematologic/Lymphatic: Negative for adenopathy and bleeding problem. Does not bruise/bleed easily.   Skin:  Negative for color change, flushing, itching and rash.   Musculoskeletal:  Negative for muscle cramps, muscle weakness, myalgias and stiffness.   Gastrointestinal:  Negative for abdominal pain, diarrhea, hematemesis, hematochezia, nausea and vomiting.   Genitourinary:  Negative for dysuria, frequency and nocturia.   Neurological:  Negative for focal weakness, loss of balance, numbness, paresthesias and seizures.   Psychiatric/Behavioral:  Negative for altered mental status, hallucinations and suicidal ideas.    Allergic/Immunologic: Negative for HIV exposure, hives and persistent infections.           Current Outpatient Medications:     Aspirin ([place]) 81 MG capsule, Take 1 capsule by mouth Daily., Disp: , Rfl:     atorvastatin (LIPITOR) 80 MG " tablet, Take 1 tablet by mouth Every Night., Disp: 90 tablet, Rfl: 0    famotidine (PEPCID) 20 MG tablet, Take 1 tablet by mouth 2 (Two) Times a Day., Disp: , Rfl:     gabapentin (NEURONTIN) 400 MG capsule, Take 1 capsule by mouth 4 (Four) Times a Day., Disp: , Rfl:     glimepiride (AMARYL) 4 MG tablet, Take 1.5 tablets by mouth 2 (Two) Times a Day. Hold until Friday due to recent contrast., Disp: , Rfl:     HYDRALAZINE HCL PO, Take 100 mg by mouth 2 (Two) Times a Day., Disp: , Rfl:     HYDROcodone-acetaminophen (NORCO) 5-325 MG per tablet, Take 1 tablet by mouth Every 12 (Twelve) Hours As Needed., Disp: , Rfl:     LORazepam (ATIVAN) 1 MG tablet, Take 1 tablet by mouth Every 12 (Twelve) Hours., Disp: , Rfl:     LORazepam (ATIVAN) 2 MG tablet, Take 1 tablet by mouth every night at bedtime., Disp: , Rfl:     meclizine (ANTIVERT) 25 MG tablet, Take 1 tablet by mouth 3 (Three) Times a Day As Needed., Disp: , Rfl:     metFORMIN (GLUCOPHAGE) 500 MG tablet, Take 2 tablets by mouth 2 (Two) Times a Day With Meals. Hold until Friday due to recent contrast., Disp: , Rfl:     amLODIPine (NORVASC) 10 MG tablet, Take 1 tablet by mouth Daily., Disp: 90 tablet, Rfl: 3    carvedilol (COREG) 6.25 MG tablet, Take 1 tablet by mouth 2 (Two) Times a Day., Disp: 180 tablet, Rfl: 3    cloNIDine (Catapres) 0.1 MG tablet, Take 1 tablet by mouth Every 4 (Four) to 6 (Six) Hours As Needed for High Blood Pressure (SBP>150mmHg and/or DBP.90mmHg)., Disp: 100 tablet, Rfl: 3    Objective:   Vitals and nursing note reviewed.   Constitutional:       Appearance: Healthy appearance. Not in distress.   Neck:      Vascular: No JVR. JVD normal.   Pulmonary:      Effort: Pulmonary effort is normal.      Breath sounds: Normal breath sounds. No wheezing. No rhonchi. No rales.   Chest:      Chest wall: Not tender to palpatation.   Cardiovascular:      PMI at left midclavicular line. Normal rate. Regular rhythm. Normal S1. Normal S2.       Murmurs: There is a  "low frequency harsh crescendo-decrescendo midsystolic murmur at the URSB, radiating to the neck.      No gallop.  No click. No rub.   Pulses:     Intact distal pulses.   Edema:     Peripheral edema absent.   Abdominal:      General: Bowel sounds are normal.      Palpations: Abdomen is soft.      Tenderness: There is no abdominal tenderness.   Musculoskeletal: Normal range of motion.         General: No tenderness. Skin:     General: Skin is warm and dry.   Neurological:      General: No focal deficit present.      Mental Status: Alert and oriented to person, place and time.       Blood pressure 140/90, pulse 71, height 160 cm (63\"), weight 79.8 kg (176 lb), SpO2 95%.   Lab Review:     Assessment:       1. Precordial pain  Angina pectoris primarily due to uncontrolled hypertension.  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  Angiographically, this does not appear to have a course of the right coronary artery passing between the ascending thoracic aorta and main pulmonary artery; however, the this anatomical variant could not be completely excluded.  - CT CARDIAC MOPRH W 3D IMAGE (PRE-ABLATION/LAAO)    2. Primary hypertension  Poor blood pressure control.    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  - CT CARDIAC MOPRH W 3D IMAGE (PRE-ABLATION/LAAO)    4. Aortic stenosis, mild  Concordant murmur.    Procedures    Plan:     Advance Care Planning   ACP discussion was held with the patient during this visit. Patient has an advance directive (not in EMR), copy requested.     I have recommended coronary CT angiography to accurately delineate the course of her right coronary artery and subsequently exclude the possibility of the right coronary artery passing between the ascending " thoracic aorta and main pulmonary artery.    I have recommended increasing Coreg to 6.25 mg orally twice daily.    I have recommended starting amlodipine 10 mg orally once per day.    I have recommended starting Imdur 30 mg orally every morning.    The patient has been instructed to continue taking her hydralazine 100 mg orally twice per day.    She has been given a prescription for clonidine 0.1 mg to use on an as-needed basis every 4-6 hours for systolic blood pressure greater than 150 mmHg and/or diastolic blood pressure greater than 90 mmHg.    The patient has been counseled to avoid all prescription and over-the-counter NSAIDs.    The patient has been counseled regarding the importance of diet exercise and weight management.    The patient has been counseled regarding the importance of dietary sodium restriction.    We have recommended a treatment goal of approximately 100/60 in regards to her blood pressure.    Further recommendations will be predicated on the results of her outpatient testing.       Patient

## 2025-01-09 NOTE — ADDENDUM NOTE
Addended by: LIDIA YEE on: 1/9/2025 09:19 AM     Modules accepted: Orders     Dr. Pineda please advise.    Form signed and given to staff earlier.    Patient called, verified Name and . She is following up regarding the form below. States that her supervisor at work is following up as well and she does not want to compromise her job.     Dr. Pineda please advise.    Patient is not ready to resume work and needed 3  more  weeks . Dropped off Form to be completed. Please contact patient for pick-up. Form placed in Dr Pineda inbox.   Spoke to pt and informed of form ready for  in .   see chief complaint quote

## 2025-01-10 ENCOUNTER — TELEPHONE (OUTPATIENT)
Dept: CARDIOLOGY | Facility: CLINIC | Age: 75
End: 2025-01-10
Payer: MEDICARE

## 2025-01-10 NOTE — TELEPHONE ENCOUNTER
Patient's daughter called and said that her mom was given 4 bp meds on 1/7/25. Today her bp was 85/58. I advised her that her mom is only to take the clonidine if her bp is 150/90 and higher. She said it was 170/112 this morning. I kept letting her know the clonidine is to only be taking as needed. Patient's daughter verbalized understanding of how the meds are to be taking.

## 2025-01-21 ENCOUNTER — HOSPITAL ENCOUNTER (OUTPATIENT)
Facility: HOSPITAL | Age: 75
Discharge: HOME | End: 2025-01-21
Payer: MEDICARE

## 2025-01-21 VITALS
RESPIRATION RATE: 11 BRPM | SYSTOLIC BLOOD PRESSURE: 131 MMHG | DIASTOLIC BLOOD PRESSURE: 64 MMHG | OXYGEN SATURATION: 100 % | HEIGHT: 65 IN | HEART RATE: 58 BPM | TEMPERATURE: 98.2 F | BODY MASS INDEX: 29.29 KG/M2

## 2025-01-21 LAB
CREAT BLDA-MCNC: 1.1 MG/DL (ref 0.6–1.3)
CREAT BLDA-MCNC: 1.1 MG/DL (ref 0.6–1.3)

## 2025-01-21 PROCEDURE — 75574 CT ANGIO HRT W/3D IMAGE: CPT | Performed by: INTERNAL MEDICINE

## 2025-01-21 PROCEDURE — 25510000001 IOPAMIDOL PER 1 ML: Performed by: INTERNAL MEDICINE

## 2025-01-21 PROCEDURE — 82565 ASSAY OF CREATININE: CPT

## 2025-01-21 PROCEDURE — 75574 CT ANGIO HRT W/3D IMAGE: CPT

## 2025-01-21 RX ORDER — METOPROLOL TARTRATE 1 MG/ML
5 INJECTION, SOLUTION INTRAVENOUS
Status: DISCONTINUED | OUTPATIENT
Start: 2025-01-21 | End: 2025-01-22 | Stop reason: HOSPADM

## 2025-01-21 RX ORDER — METOPROLOL TARTRATE 100 MG/1
200 TABLET ORAL ONCE
Status: DISCONTINUED | OUTPATIENT
Start: 2025-01-21 | End: 2025-01-22 | Stop reason: HOSPADM

## 2025-01-21 RX ORDER — METOPROLOL TARTRATE 25 MG/1
25 TABLET, FILM COATED ORAL ONCE
Status: DISCONTINUED | OUTPATIENT
Start: 2025-01-21 | End: 2025-01-22 | Stop reason: HOSPADM

## 2025-01-21 RX ORDER — METOPROLOL TARTRATE 25 MG/1
50 TABLET, FILM COATED ORAL ONCE
Status: DISCONTINUED | OUTPATIENT
Start: 2025-01-21 | End: 2025-01-22 | Stop reason: HOSPADM

## 2025-01-21 RX ORDER — IVABRADINE 5 MG/1
15 TABLET, FILM COATED ORAL ONCE
Status: DISCONTINUED | OUTPATIENT
Start: 2025-01-21 | End: 2025-01-22 | Stop reason: HOSPADM

## 2025-01-21 RX ORDER — METOPROLOL TARTRATE 25 MG/1
50 TABLET, FILM COATED ORAL
Status: DISCONTINUED | OUTPATIENT
Start: 2025-01-21 | End: 2025-01-22 | Stop reason: HOSPADM

## 2025-01-21 RX ORDER — METOPROLOL TARTRATE 100 MG/1
100 TABLET ORAL ONCE
Status: DISCONTINUED | OUTPATIENT
Start: 2025-01-21 | End: 2025-01-22 | Stop reason: HOSPADM

## 2025-01-21 RX ORDER — NITROGLYCERIN 0.4 MG/1
0.4 TABLET SUBLINGUAL
Status: COMPLETED | OUTPATIENT
Start: 2025-01-21 | End: 2025-01-21

## 2025-01-21 RX ORDER — NITROGLYCERIN 0.4 MG/1
0.8 TABLET SUBLINGUAL
Status: COMPLETED | OUTPATIENT
Start: 2025-01-21 | End: 2025-01-21

## 2025-01-21 RX ORDER — IOPAMIDOL 755 MG/ML
100 INJECTION, SOLUTION INTRAVASCULAR
Status: COMPLETED | OUTPATIENT
Start: 2025-01-21 | End: 2025-01-21

## 2025-01-21 RX ADMIN — NITROGLYCERIN 0.8 MG: 0.4 TABLET SUBLINGUAL at 09:42

## 2025-01-21 RX ADMIN — IOPAMIDOL 65 ML: 755 INJECTION, SOLUTION INTRAVENOUS at 10:00

## 2025-01-21 NOTE — DISCHARGE INSTRUCTIONS
MAKE SURE TO DRINK PLENTY OF FLUIDS OF FOR THE NEXT 48 HOURS TO FLUSH THE CONTRAST DYE THROUGH YOUR KIDNEYS TO PROTECT RENAL FUNCTION. DO NOT TAKE METFORMIN FOR THE NEXT 48 HOURRS

## 2025-01-24 ENCOUNTER — DOCUMENTATION (OUTPATIENT)
Dept: CARDIOLOGY | Facility: CLINIC | Age: 75
End: 2025-01-24
Payer: MEDICARE

## 2025-01-24 NOTE — PROGRESS NOTES
CCTA images    RCA arising from the right coronary cusp at 12 o'clock      2. Left Main arising from the left coronary cusp at ~4 o'clock (angulated view to see the origin; of note, calcified and with atherosclerosis)

## 2025-02-06 ENCOUNTER — TELEPHONE (OUTPATIENT)
Dept: CARDIOLOGY | Facility: CLINIC | Age: 75
End: 2025-02-06
Payer: MEDICARE

## 2025-02-06 NOTE — TELEPHONE ENCOUNTER
Continue clonidine as prescribed with instructions to lay down with feet elevated for 30-60 minutes after taking a prn clonidine.

## 2025-02-06 NOTE — TELEPHONE ENCOUNTER
Caller: Ganga Teresata Vargas    Relationship: Self    Best call back number:[phone]       Caller requesting test results: SEGUN COOLEY    What test was performed: CT ANGIOGRAM CORONARY    When was the test performed: 1-24-25    Where was the test performed: [facility]    Additional notes: PATIENT WOULD LIKE A CALL BACK WHEN RESULTS ARE IN AND READY TO DISCUSS.

## 2025-02-06 NOTE — TELEPHONE ENCOUNTER
Pt notified and states that her BP this morning was 146/82. Pt states her BP was up yesterday around 175/85 and she took a clonidine. Pt states that she cannot take the clonidine as prescribed due to it making her dizzy, she does take her BP after she takes this and it drops it to 128/xx and that's the lowest it has ever went to. Pt states she takes her Meds on time and makes sure to wait an hour after she takes her meds to take her BP. She also stated she has been watching her sodium intake and has been making sure to check the nutritional information on foods also.

## 2025-02-07 ENCOUNTER — APPOINTMENT (OUTPATIENT)
Dept: GENERAL RADIOLOGY | Facility: HOSPITAL | Age: 75
End: 2025-02-07
Payer: MEDICARE

## 2025-02-07 ENCOUNTER — HOSPITAL ENCOUNTER (EMERGENCY)
Facility: HOSPITAL | Age: 75
Discharge: LEFT BEFORE DISCHARGE | End: 2025-02-08
Attending: EMERGENCY MEDICINE
Payer: MEDICARE

## 2025-02-07 DIAGNOSIS — E16.2 HYPOGLYCEMIA: Primary | ICD-10-CM

## 2025-02-07 DIAGNOSIS — R00.1 BRADYCARDIA: ICD-10-CM

## 2025-02-07 LAB
ALBUMIN SERPL-MCNC: 4 G/DL (ref 3.5–5.2)
ALBUMIN/GLOB SERPL: 1.3 G/DL
ALP SERPL-CCNC: 67 U/L (ref 39–117)
ALT SERPL W P-5'-P-CCNC: 12 U/L (ref 1–33)
ANION GAP SERPL CALCULATED.3IONS-SCNC: 10.1 MMOL/L (ref 5–15)
AST SERPL-CCNC: 21 U/L (ref 1–32)
BASOPHILS # BLD AUTO: 0.03 10*3/MM3 (ref 0–0.2)
BASOPHILS NFR BLD AUTO: 1 % (ref 0–1.5)
BILIRUB SERPL-MCNC: 0.2 MG/DL (ref 0–1.2)
BUN SERPL-MCNC: 17 MG/DL (ref 8–23)
BUN/CREAT SERPL: 15.7 (ref 7–25)
CALCIUM SPEC-SCNC: 9.4 MG/DL (ref 8.6–10.5)
CHLORIDE SERPL-SCNC: 102 MMOL/L (ref 98–107)
CO2 SERPL-SCNC: 24.9 MMOL/L (ref 22–29)
CREAT SERPL-MCNC: 1.08 MG/DL (ref 0.57–1)
DEPRECATED RDW RBC AUTO: 49.5 FL (ref 37–54)
EGFRCR SERPLBLD CKD-EPI 2021: 53.3 ML/MIN/1.73
EOSINOPHIL # BLD AUTO: 0.18 10*3/MM3 (ref 0–0.4)
EOSINOPHIL NFR BLD AUTO: 6.1 % (ref 0.3–6.2)
ERYTHROCYTE [DISTWIDTH] IN BLOOD BY AUTOMATED COUNT: 14.5 % (ref 12.3–15.4)
GLOBULIN UR ELPH-MCNC: 3 GM/DL
GLUCOSE SERPL-MCNC: 52 MG/DL (ref 65–99)
HCT VFR BLD AUTO: 32.9 % (ref 34–46.6)
HGB BLD-MCNC: 10.9 G/DL (ref 12–15.9)
HOLD SPECIMEN: NORMAL
HOLD SPECIMEN: NORMAL
IMM GRANULOCYTES # BLD AUTO: 0.02 10*3/MM3 (ref 0–0.05)
IMM GRANULOCYTES NFR BLD AUTO: 0.7 % (ref 0–0.5)
LYMPHOCYTES # BLD AUTO: 1.46 10*3/MM3 (ref 0.7–3.1)
LYMPHOCYTES NFR BLD AUTO: 49.5 % (ref 19.6–45.3)
MAGNESIUM SERPL-MCNC: 2 MG/DL (ref 1.6–2.4)
MCH RBC QN AUTO: 30.8 PG (ref 26.6–33)
MCHC RBC AUTO-ENTMCNC: 33.1 G/DL (ref 31.5–35.7)
MCV RBC AUTO: 92.9 FL (ref 79–97)
MONOCYTES # BLD AUTO: 0.3 10*3/MM3 (ref 0.1–0.9)
MONOCYTES NFR BLD AUTO: 10.2 % (ref 5–12)
NEUTROPHILS NFR BLD AUTO: 0.96 10*3/MM3 (ref 1.7–7)
NEUTROPHILS NFR BLD AUTO: 32.5 % (ref 42.7–76)
NRBC BLD AUTO-RTO: 0 /100 WBC (ref 0–0.2)
PLATELET # BLD AUTO: 196 10*3/MM3 (ref 140–450)
PMV BLD AUTO: 10.4 FL (ref 6–12)
POTASSIUM SERPL-SCNC: 3.8 MMOL/L (ref 3.5–5.2)
PROT SERPL-MCNC: 7 G/DL (ref 6–8.5)
RBC # BLD AUTO: 3.54 10*6/MM3 (ref 3.77–5.28)
SODIUM SERPL-SCNC: 137 MMOL/L (ref 136–145)
TROPONIN T SERPL HS-MCNC: 14 NG/L
WBC NRBC COR # BLD AUTO: 2.95 10*3/MM3 (ref 3.4–10.8)
WHOLE BLOOD HOLD COAG: NORMAL
WHOLE BLOOD HOLD SPECIMEN: NORMAL

## 2025-02-07 PROCEDURE — 93005 ELECTROCARDIOGRAM TRACING: CPT | Performed by: EMERGENCY MEDICINE

## 2025-02-07 PROCEDURE — 84484 ASSAY OF TROPONIN QUANT: CPT | Performed by: EMERGENCY MEDICINE

## 2025-02-07 PROCEDURE — 80053 COMPREHEN METABOLIC PANEL: CPT | Performed by: EMERGENCY MEDICINE

## 2025-02-07 PROCEDURE — 99284 EMERGENCY DEPT VISIT MOD MDM: CPT | Performed by: EMERGENCY MEDICINE

## 2025-02-07 PROCEDURE — 71045 X-RAY EXAM CHEST 1 VIEW: CPT

## 2025-02-07 PROCEDURE — 83735 ASSAY OF MAGNESIUM: CPT | Performed by: EMERGENCY MEDICINE

## 2025-02-07 PROCEDURE — 85025 COMPLETE CBC W/AUTO DIFF WBC: CPT | Performed by: EMERGENCY MEDICINE

## 2025-02-07 RX ORDER — DEXTROSE MONOHYDRATE 100 MG/ML
250 INJECTION, SOLUTION INTRAVENOUS ONCE
Status: COMPLETED | OUTPATIENT
Start: 2025-02-08 | End: 2025-02-08

## 2025-02-07 RX ORDER — SODIUM CHLORIDE 0.9 % (FLUSH) 0.9 %
10 SYRINGE (ML) INJECTION AS NEEDED
Status: DISCONTINUED | OUTPATIENT
Start: 2025-02-07 | End: 2025-02-08 | Stop reason: HOSPADM

## 2025-02-07 RX ORDER — ACETAMINOPHEN 325 MG/1
975 TABLET ORAL ONCE
Status: COMPLETED | OUTPATIENT
Start: 2025-02-08 | End: 2025-02-08

## 2025-02-08 ENCOUNTER — APPOINTMENT (OUTPATIENT)
Dept: CT IMAGING | Facility: HOSPITAL | Age: 75
End: 2025-02-08
Payer: MEDICARE

## 2025-02-08 VITALS
RESPIRATION RATE: 16 BRPM | OXYGEN SATURATION: 97 % | BODY MASS INDEX: 27.49 KG/M2 | WEIGHT: 165 LBS | HEART RATE: 50 BPM | SYSTOLIC BLOOD PRESSURE: 138 MMHG | HEIGHT: 65 IN | TEMPERATURE: 98.7 F | DIASTOLIC BLOOD PRESSURE: 77 MMHG

## 2025-02-08 LAB
BILIRUB UR QL STRIP: NEGATIVE
CLARITY UR: CLEAR
COLOR UR: YELLOW
FLUAV RNA RESP QL NAA+PROBE: NOT DETECTED
FLUBV RNA RESP QL NAA+PROBE: NOT DETECTED
GEN 5 1HR TROPONIN T REFLEX: 12 NG/L
GLUCOSE BLDC GLUCOMTR-MCNC: 136 MG/DL (ref 70–130)
GLUCOSE BLDC GLUCOMTR-MCNC: 182 MG/DL (ref 70–130)
GLUCOSE UR STRIP-MCNC: ABNORMAL MG/DL
HGB UR QL STRIP.AUTO: NEGATIVE
KETONES UR QL STRIP: NEGATIVE
LEUKOCYTE ESTERASE UR QL STRIP.AUTO: NEGATIVE
NITRITE UR QL STRIP: NEGATIVE
PH UR STRIP.AUTO: 5.5 [PH] (ref 5–8)
PROT UR QL STRIP: NEGATIVE
SARS-COV-2 RNA RESP QL NAA+PROBE: NOT DETECTED
SP GR UR STRIP: 1.01 (ref 1–1.03)
TROPONIN T % DELTA: -14
TROPONIN T NUMERIC DELTA: -2 NG/L
UROBILINOGEN UR QL STRIP: ABNORMAL

## 2025-02-08 PROCEDURE — 70450 CT HEAD/BRAIN W/O DYE: CPT

## 2025-02-08 PROCEDURE — 82948 REAGENT STRIP/BLOOD GLUCOSE: CPT | Performed by: EMERGENCY MEDICINE

## 2025-02-08 PROCEDURE — 25810000003 SODIUM CHLORIDE 0.9 % SOLUTION: Performed by: EMERGENCY MEDICINE

## 2025-02-08 PROCEDURE — 87636 SARSCOV2 & INF A&B AMP PRB: CPT | Performed by: EMERGENCY MEDICINE

## 2025-02-08 PROCEDURE — 96365 THER/PROPH/DIAG IV INF INIT: CPT

## 2025-02-08 PROCEDURE — 84484 ASSAY OF TROPONIN QUANT: CPT | Performed by: EMERGENCY MEDICINE

## 2025-02-08 PROCEDURE — 81003 URINALYSIS AUTO W/O SCOPE: CPT | Performed by: EMERGENCY MEDICINE

## 2025-02-08 RX ADMIN — DEXTROSE MONOHYDRATE 250 ML: 100 INJECTION, SOLUTION INTRAVENOUS at 00:17

## 2025-02-08 RX ADMIN — ACETAMINOPHEN 975 MG: 325 TABLET, FILM COATED ORAL at 00:17

## 2025-02-08 RX ADMIN — SODIUM CHLORIDE 500 ML: 9 INJECTION, SOLUTION INTRAVENOUS at 00:17

## 2025-02-08 NOTE — ED PROVIDER NOTES
EMERGENCY DEPARTMENT ENCOUNTER    Pt Name: Catrachito Schuler  MRN: [id number]  Pt :   1950  Room Number:  02SF02  Date of encounter:  2025  PCP: Keysha Guerra DO  ED Provider: Dougie Chong MD    Historian: Daughter, patient      HPI:  Chief Complaint: Hypertension, headache, fatigue        Context: Catrachito Schuler is a 74 y.o. female who presents to the ED c/o hypertension, headache and fatigue.  Past history significant for diabetes, hyperlipidemia, hypertension.  Daughter says that throughout the day today the patient has had headache, fatigue, elevated blood pressure.  Daughter says patient is also intermittently complained of chest pain today.  Patient denies having vomiting or diarrhea.  No abdominal pain.      PAST MEDICAL HISTORY  Past Medical History:   Diagnosis Date    Cancer     Diabetes mellitus     GERD (gastroesophageal reflux disease)     Hyperlipidemia     Hypertension     Kidney disease     Murmur     Myocardial infarction          PAST SURGICAL HISTORY  Past Surgical History:   Procedure Laterality Date    CARDIAC CATHETERIZATION N/A 2024    Procedure: Coronary angiography;  Surgeon: Jono Monteiro MD;  Location: [facility] INVASIVE LOCATION;  Service: Cardiology;  Laterality: N/A;    HYSTERECTOMY      MASTECTOMY, PARTIAL           FAMILY HISTORY  History reviewed. No pertinent family history.      SOCIAL HISTORY  Social History     Socioeconomic History    Marital status:    Tobacco Use    Smoking status: Never    Smokeless tobacco: Never   Vaping Use    Vaping status: Never Used   Substance and Sexual Activity    Alcohol use: Never    Drug use: Never    Sexual activity: Defer         ALLERGIES  Patient has no known allergies.        REVIEW OF SYSTEMS    All systems reviewed and negative except for those discussed in HPI.       PHYSICAL EXAM    I have reviewed the triage vital signs and nursing notes.    ED Triage Vitals [25 [id number]]   Temp Heart  Rate Resp BP SpO2   98.7 °F (37.1 °C) 55 16 172/90 96 %      Temp src Heart Rate Source Patient Position BP Location FiO2 (%)   Oral Monitor Sitting Right arm --         General: Moderate acute distress  Skin: normal color, warm and dry  Head: normocephalic, atraumatic  Eyes: Pupils equally round and reactive to light.  Nose: normal nasal mucosa, no visible deformity.  Mouth: moist mucous membranes.  Neck: supple.  Chest: no retractions, no visible deformity  Cardiovascular: Bradycardic, regular rhythm  Lungs: clear to auscultation bilaterally.  Abdomen: soft, non-tender, non-distended. No rebound tenderness, no guarding.  No peritonitis.  Neuro:  alert and oriented x3, no focal neurological deficits.  5 out of 5 strength about the bilateral upper and lower extremities.  No dysarthria, no aphasia.  No facial droop.  Psych:  appropriate mood and behavior.        LAB RESULTS  Recent Results (from the past 24 hours)   Comprehensive Metabolic Panel    Collection Time: 02/07/25 11:25 PM    Specimen: Blood   Result Value Ref Range    Glucose 52 (L) 65 - 99 mg/dL    BUN 17 8 - 23 mg/dL    Creatinine 1.08 (H) 0.57 - 1.00 mg/dL    Sodium 137 136 - 145 mmol/L    Potassium 3.8 3.5 - 5.2 mmol/L    Chloride 102 98 - 107 mmol/L    CO2 24.9 22.0 - 29.0 mmol/L    Calcium 9.4 8.6 - 10.5 mg/dL    Total Protein 7.0 6.0 - 8.5 g/dL    Albumin 4.0 3.5 - 5.2 g/dL    ALT (SGPT) 12 1 - 33 U/L    AST (SGOT) 21 1 - 32 U/L    Alkaline Phosphatase 67 39 - 117 U/L    Total Bilirubin 0.2 0.0 - 1.2 mg/dL    Globulin 3.0 gm/dL    A/G Ratio 1.3 g/dL    BUN/Creatinine Ratio 15.7 7.0 - 25.0    Anion Gap 10.1 5.0 - 15.0 mmol/L    eGFR 53.3 (L) >60.0 mL/min/1.73   High Sensitivity Troponin T    Collection Time: 02/07/25 11:25 PM    Specimen: Blood   Result Value Ref Range    HS Troponin T 14 (H) <14 ng/L   Magnesium    Collection Time: 02/07/25 11:25 PM    Specimen: Blood   Result Value Ref Range    Magnesium 2.0 1.6 - 2.4 mg/dL   Green Top (Gel)     Collection Time: 02/07/25 11:25 PM   Result Value Ref Range    Extra Tube Hold for add-ons.    Lavender Top    Collection Time: 02/07/25 11:25 PM   Result Value Ref Range    Extra Tube hold for add-on    Gold Top - SST    Collection Time: 02/07/25 11:25 PM   Result Value Ref Range    Extra Tube Hold for add-ons.    Light Blue Top    Collection Time: 02/07/25 11:25 PM   Result Value Ref Range    Extra Tube Hold for add-ons.    CBC Auto Differential    Collection Time: 02/07/25 11:25 PM    Specimen: Blood   Result Value Ref Range    WBC 2.95 (L) 3.40 - 10.80 10*3/mm3    RBC 3.54 (L) 3.77 - 5.28 10*6/mm3    Hemoglobin 10.9 (L) 12.0 - 15.9 g/dL    Hematocrit 32.9 (L) 34.0 - 46.6 %    MCV 92.9 79.0 - 97.0 fL    MCH 30.8 26.6 - 33.0 pg    MCHC 33.1 31.5 - 35.7 g/dL    RDW 14.5 12.3 - 15.4 %    RDW-SD 49.5 37.0 - 54.0 fl    MPV 10.4 6.0 - 12.0 fL    Platelets 196 140 - 450 10*3/mm3    Neutrophil % 32.5 (L) 42.7 - 76.0 %    Lymphocyte % 49.5 (H) 19.6 - 45.3 %    Monocyte % 10.2 5.0 - 12.0 %    Eosinophil % 6.1 0.3 - 6.2 %    Basophil % 1.0 0.0 - 1.5 %    Immature Grans % 0.7 (H) 0.0 - 0.5 %    Neutrophils, Absolute 0.96 (L) 1.70 - 7.00 10*3/mm3    Lymphocytes, Absolute 1.46 0.70 - 3.10 10*3/mm3    Monocytes, Absolute 0.30 0.10 - 0.90 10*3/mm3    Eosinophils, Absolute 0.18 0.00 - 0.40 10*3/mm3    Basophils, Absolute 0.03 0.00 - 0.20 10*3/mm3    Immature Grans, Absolute 0.02 0.00 - 0.05 10*3/mm3    nRBC 0.0 0.0 - 0.2 /100 WBC   COVID-19 and FLU A/B PCR, 1 HR TAT - Swab, Nasopharynx    Collection Time: 02/08/25 12:19 AM    Specimen: Nasopharynx; Swab   Result Value Ref Range    COVID19 Not Detected Not Detected - Ref. Range    Influenza A PCR Not Detected Not Detected    Influenza B PCR Not Detected Not Detected   POC Glucose Once    Collection Time: 02/08/25 12:23 AM    Specimen: Blood   Result Value Ref Range    Glucose 136 (H) 70 - 130 mg/dL   High Sensitivity Troponin T 1Hr    Collection Time: 02/08/25 12:30 AM     Specimen: Blood   Result Value Ref Range    HS Troponin T 12 <14 ng/L    Troponin T Numeric Delta -2 ng/L    Troponin T % Delta -14 Abnormal if >/= 20%   POC Glucose Q1H    Collection Time: 02/08/25  1:10 AM    Specimen: Blood   Result Value Ref Range    Glucose 182 (H) 70 - 130 mg/dL   Urinalysis With Microscopic If Indicated (No Culture) - Urine, Clean Catch    Collection Time: 02/08/25  1:25 AM    Specimen: Urine, Clean Catch   Result Value Ref Range    Color, UA Yellow Yellow, Straw    Appearance, UA Clear Clear    pH, UA 5.5 5.0 - 8.0    Specific Gravity, UA 1.011 1.005 - 1.030    Glucose,  mg/dL (Trace) (A) Negative    Ketones, UA Negative Negative    Bilirubin, UA Negative Negative    Blood, UA Negative Negative    Protein, UA Negative Negative    Leuk Esterase, UA Negative Negative    Nitrite, UA Negative Negative    Urobilinogen, UA 0.2 E.U./dL 0.2 - 1.0 E.U./dL       If labs were ordered, I independently reviewed the results and considered them in treating the patient.  See medical decision making discussion section for my interpretation of lab results.        RADIOLOGY  CT Head Without Contrast    Result Date: 2/8/2025  FINAL REPORT TECHNIQUE: null CLINICAL HISTORY: Headache/ dizziness COMPARISON: null FINDINGS: Exam: CT Brain without contrast Comparison: None Clinical history: Headache dizziness Findings: No acute intracranial hemorrhage Ventricles are normal in size and position. No intracranial mass No midline shift. No abnormal extra-axial fluid collections. No skull fracture identified. Visualized paranasal sinuses are clear.     Impression: 1. No acute intracranial hemorrhage or process identified. Authenticated and Electronically Signed by Eileen Barboza MD on 02/08/2025 12:43:43 AM     I ordered and independently reviewed the above noted radiographic studies.  See radiologist's dictation for official interpretation.    Per my independent reading:      Chest radiograph obtained and based on  my independent reading shows pulmonary vascular congestion but otherwise negative for acute cardiopulmonary findings based on my independent reading.            PROCEDURES    Procedures    ECG 12 Lead ED Triage Standing Order; Weak / Dizzy / AMS   Final Result          MEDICATIONS GIVEN IN ER    Medications   acetaminophen (TYLENOL) tablet 975 mg (975 mg Oral Given 2/8/25 0017)   sodium chloride 0.9 % bolus 500 mL (0 mL Intravenous Stopped 2/8/25 0157)   dextrose 10 % infusion (0 mL Intravenous Stopped 2/8/25 0125)         MEDICAL DECISION MAKING, PROGRESS, and CONSULTS    All labs, if obtained, have been independently reviewed by me.  All radiology studies, if obtained, have been reviewed by me and the radiologist dictating the report.  All EKG's, if obtained, have been independently viewed and interpreted by me/my attending physician.      Discussion below represents my analysis of pertinent findings related to patient's condition, differential diagnosis, treatment plan and final disposition.                         Differential diagnosis:    Differential includes CVA, TIA, acute coronary syndrome, viral syndrome, electrolyte abnormality, UTI, other acute emergency.    Medical Decision Making Discussion:    Vitals reviewed and demonstrate bradycardia but otherwise normal.    EKG obtained and based on my independent reading showed sinus bradycardia with right bundle branch block and intermittent premature junctional complexes..  No acute ischemic change.  Previous EKG shows presence of right bundle branch block.    Labs reviewed and show hypoglycemia.  Baseline anemia.  Negative viral panel.  Negative UA.  Initial troponin 14 with no significant delta rise on repeat.    CT head without negative per radiology.    Patient given IV dextrose and was fed for her hypoglycemia with resolution. Patient is on a sulfonylurea at home.  Given her hypoglycemia in the setting of sulfonylurea use and bradycardia of uncertain  etiology I do think hospitalization is warranted.  Patient is a GCS of 15 and has Medical Decision Making capacity.  Patient has refused hospitalization.  Daughter is at bedside who agrees patient has medical decision making capacity.  Patient signing out AGAINST MEDICAL ADVICE.  Instructed to follow-up with primary care within 1 week and to return to the emergency department before then for any concerning symptoms or new concerns.    Additional sources:    - Discussed/ obtained information from independent historians:  daughter    - External (non-ED) record review: History and physical from December 2024 documenting history of chronic kidney disease, diabetes, hypertension.  Patient hospitalized for chest pain.    Reviewed discharge summary from January 1, 2025.  Patient underwent cardiac cath with demonstration of normal coronary arteries.    Reviewed echocardiogram from December 31, 2024 showing normal EF, grade 1 diastolic dysfunction.    - Chronic or social conditions impacting care: Diabetes    Shared Decision Making:  After my consideration of clinical presentation and any laboratory/radiology studies obtained, I discussed the findings with the patient/patient representative who is in agreement with the treatment plan and the final disposition.   Risks and benefits of discharge and/or observation/admission were discussed.    Orders placed during this visit:  Orders Placed This Encounter   Procedures    COVID-19 and FLU A/B PCR, 1 HR TAT - Swab, Nasopharynx    XR Chest 1 View    CT Head Without Contrast    [facility] Draw    Comprehensive Metabolic Panel    High Sensitivity Troponin T    Magnesium    Urinalysis With Microscopic If Indicated (No Culture) - Urine, Clean Catch    CBC Auto Differential    High Sensitivity Troponin T 1Hr    Undress & Gown    Continuous Pulse Oximetry    Vital Signs    Orthostatic Blood Pressure    POC Glucose Once    ECG 12 Lead ED Triage Standing Order; Weak / Dizzy / AMS    CBC &  Differential    Green Top (Gel)    Lavender Top    Gold Top - SST    Light Blue Top       AS OF 05:52 EST VITALS:    BP - 138/77  HR - 50  TEMP - 98.7 °F (37.1 °C) (Oral)  O2 SATS - 97%                  DIAGNOSIS  Final diagnoses:   Hypoglycemia   Bradycardia         DISPOSITION  AMA      Please note that portions of this document were completed with voice recognition software.        Dougie Chong MD  02/08/25 0956

## 2025-02-21 ENCOUNTER — OFFICE VISIT (OUTPATIENT)
Dept: CARDIOLOGY | Facility: CLINIC | Age: 75
End: 2025-02-21
Payer: MEDICARE

## 2025-02-21 VITALS
OXYGEN SATURATION: 97 % | HEART RATE: 61 BPM | HEIGHT: 65 IN | WEIGHT: 172 LBS | BODY MASS INDEX: 28.66 KG/M2 | DIASTOLIC BLOOD PRESSURE: 68 MMHG | SYSTOLIC BLOOD PRESSURE: 120 MMHG

## 2025-02-21 DIAGNOSIS — I25.10 CORONARY ARTERY DISEASE INVOLVING NATIVE CORONARY ARTERY OF NATIVE HEART WITHOUT ANGINA PECTORIS: ICD-10-CM

## 2025-02-21 DIAGNOSIS — I10 PRIMARY HYPERTENSION: ICD-10-CM

## 2025-02-21 DIAGNOSIS — I35.0 AORTIC STENOSIS, MILD: Primary | ICD-10-CM

## 2025-02-21 DIAGNOSIS — E78.5 DYSLIPIDEMIA: ICD-10-CM

## 2025-02-21 PROBLEM — Q24.5 ANOMALOUS CORONARY ARTERY ORIGIN: Status: RESOLVED | Noted: 2025-01-07 | Resolved: 2025-02-21

## 2025-02-21 PROCEDURE — 1159F MED LIST DOCD IN RCRD: CPT | Performed by: INTERNAL MEDICINE

## 2025-02-21 PROCEDURE — 3078F DIAST BP <80 MM HG: CPT | Performed by: INTERNAL MEDICINE

## 2025-02-21 PROCEDURE — 1160F RVW MEDS BY RX/DR IN RCRD: CPT | Performed by: INTERNAL MEDICINE

## 2025-02-21 PROCEDURE — 3074F SYST BP LT 130 MM HG: CPT | Performed by: INTERNAL MEDICINE

## 2025-02-21 PROCEDURE — 99213 OFFICE O/P EST LOW 20 MIN: CPT | Performed by: INTERNAL MEDICINE

## 2025-02-21 RX ORDER — LORAZEPAM 1 MG/1
1 TABLET ORAL EVERY 12 HOURS SCHEDULED
COMMUNITY
Start: 2025-02-09

## 2025-02-21 NOTE — PROGRESS NOTES
"    Subjective:     Encounter Date:02/21/2025      Patient ID: Catrachito Schuler is a 74 y.o. female.    Chief Complaint: Coronary artery disease  HPI  This is a 74-year-old female patient who presents to cardiology clinic to follow-up outpatient testing for concern of possible anomalous right coronary artery arising from the left coronary cusp and passing between the ascending aorta and main pulmonary artery.  The patient underwent a coronary CT angiogram which refuted this concern and that the right coronary artery was demonstrated to originate from the right coronary cusp and was confirmed NOT to have an interarterial course.  The patient's primary issue at today's visit is related to fluctuating blood pressure.  The patient indicates that in the morning prior to taking her blood pressure medications she will commonly have systolic blood pressures around 200 mmHg.  Her blood pressure tends to improve within a few hours of taking her morning blood pressure medications.  Her daughter indicates that her evening blood pressures have also been elevated.  The patient continues to use her clonidine and hydralazine on an as-needed basis for systolic blood pressures greater than 150 mmHg and/or diastolic blood pressures greater than 90 mmHg.  The patient's daughter is concerned that she is not taking her medication at the same time each and every day.  She is not on a sodium restricted diet.  She overall reports compliance with her medications.  She is a non-smoker.  The patient is concerned about chronic left groin pain.  There is the possibility that chronic pain is affecting her blood pressure.  The patient had a left inguinal hernia repair several years ago utilizing a mesh product.  Apparently the patient's hernia has not recurred but she indicates that she is had persistent pain in her left groin since the surgery and her blood pressure was \"never an issue\" prior to the hernia repair operation.  The following " portions of the patient's history were reviewed and updated as appropriate: allergies, current medications, past family history, past medical history, past social history, past surgical history and problem  Review of Systems   Constitutional: Negative for chills, diaphoresis, fever, malaise/fatigue, weight gain and weight loss.   HENT:  Negative for ear discharge, hearing loss, hoarse voice and nosebleeds.    Eyes:  Negative for discharge, double vision, pain and photophobia.   Cardiovascular:  Negative for chest pain, claudication, cyanosis, dyspnea on exertion, irregular heartbeat, leg swelling, near-syncope, orthopnea, palpitations, paroxysmal nocturnal dyspnea and syncope.   Respiratory:  Negative for cough, hemoptysis, sputum production and wheezing.    Endocrine: Negative for cold intolerance, heat intolerance, polydipsia, polyphagia and polyuria.   Hematologic/Lymphatic: Negative for adenopathy and bleeding problem. Does not bruise/bleed easily.   Skin:  Negative for color change, flushing, itching and rash.   Musculoskeletal:  Negative for muscle cramps, muscle weakness, myalgias and stiffness.   Gastrointestinal:  Negative for abdominal pain, diarrhea, hematemesis, hematochezia, nausea and vomiting.   Genitourinary:  Negative for dysuria, frequency and nocturia.   Neurological:  Negative for focal weakness, loss of balance, numbness, paresthesias and seizures.   Psychiatric/Behavioral:  Negative for altered mental status, hallucinations and suicidal ideas.    Allergic/Immunologic: Negative for HIV exposure, hives and persistent infections.           Current Outpatient Medications:     amLODIPine (NORVASC) 10 MG tablet, Take 1 tablet by mouth Daily., Disp: 90 tablet, Rfl: 3    Aspirin (V[place]) 81 MG capsule, Take 1 capsule by mouth Daily., Disp: , Rfl:     atorvastatin (LIPITOR) 80 MG tablet, Take 1 tablet by mouth Every Night., Disp: 90 tablet, Rfl: 0    carvedilol (COREG) 6.25 MG tablet, Take 1 tablet by  mouth 2 (Two) Times a Day., Disp: 180 tablet, Rfl: 3    cloNIDine (Catapres) 0.1 MG tablet, Take 1 tablet by mouth Every 4 (Four) to 6 (Six) Hours As Needed for High Blood Pressure (SBP>150mmHg and/or DBP.90mmHg)., Disp: 100 tablet, Rfl: 3    famotidine (PEPCID) 20 MG tablet, Take 1 tablet by mouth 2 (Two) Times a Day., Disp: , Rfl:     glimepiride (AMARYL) 4 MG tablet, Take 1.5 tablets by mouth 2 (Two) Times a Day. Hold until Friday due to recent contrast., Disp: , Rfl:     HYDRALAZINE HCL PO, Take 100 mg by mouth 2 (Two) Times a Day As Needed (takes midday and bedtime if sbp greater 150)., Disp: , Rfl:     HYDROcodone-acetaminophen (NORCO) 5-325 MG per tablet, Take 1 tablet by mouth Every 12 (Twelve) Hours As Needed., Disp: , Rfl:     isosorbide mononitrate (IMDUR) 30 MG 24 hr tablet, Take 1 tablet by mouth Every Morning., Disp: 90 tablet, Rfl: 3    LORazepam (ATIVAN) 1 MG tablet, Take 1 tablet by mouth Every 12 (Twelve) Hours., Disp: , Rfl:     meclizine (ANTIVERT) 25 MG tablet, Take 1 tablet by mouth 3 (Three) Times a Day As Needed., Disp: , Rfl:     metFORMIN (GLUCOPHAGE) 500 MG tablet, Take 2 tablets by mouth 2 (Two) Times a Day With Meals. Hold until Friday due to recent contrast., Disp: , Rfl:     Objective:   Vitals and nursing note reviewed.   Constitutional:       Appearance: Healthy appearance. Not in distress.   Neck:      Vascular: No JVR. JVD normal.   Pulmonary:      Effort: Pulmonary effort is normal.      Breath sounds: Normal breath sounds. No wheezing. No rhonchi. No rales.   Chest:      Chest wall: Not tender to palpatation.   Cardiovascular:      PMI at left midclavicular line. Normal rate. Regular rhythm. Normal S1. Normal S2.       Murmurs: There is a grade 2/6 harsh crescendo-decrescendo midsystolic murmur at the URSB, radiating to the neck.      No gallop.  No click. No rub.   Pulses:     Intact distal pulses.   Edema:     Peripheral edema absent.   Abdominal:      General: Bowel sounds are  "normal.      Palpations: Abdomen is soft.      Tenderness: There is no abdominal tenderness.   Musculoskeletal: Normal range of motion.         General: No tenderness. Skin:     General: Skin is warm and dry.   Neurological:      General: No focal deficit present.      Mental Status: Alert and oriented to person, place and time.       Blood pressure 120/68, pulse 61, height 165.1 cm (65\"), weight 78 kg (172 lb), SpO2 97%.   Lab Review:     Assessment:       1. Aortic stenosis, mild  Asymptomatic.  Concordant murmur.    2. Primary hypertension  Acceptable blood pressure control.  The patient's last 4 blood pressures over the last 2 months taken in the clinical setting have all been appropriate.  The patient has multiple medications to use on an as-needed basis for \"spikes\" in her blood pressure.    3. Coronary artery disease involving native coronary artery of native heart without angina pectoris  Coronary CT angiography confirms that she does not have an anomalous origin of her right coronary artery.    4. Dyslipidemia  Tolerating high intensity statin based cholesterol-lowering therapy without side effects.    Procedures    Plan:     Advance Care Planning   ACP discussion was held with the patient during this visit. Patient has an advance directive (not in EMR), copy requested.     I have recommended a \"second opinion\" regarding her blood pressure management from the local nephrologist.    The patient has been reassured that she does not have an anomalous right coronary artery based on the results of her CT angiogram.    No changes in medications made at today's visit.    The patient has been offered the option of a \"second opinion\" from the local general surgeon regarding her prior hernia repair surgery.  The patient's daughter indicates that she would like to discuss this further with the patient's primary care provider.    No cardiovascular testing indicated at this time.      "

## (undated) DEVICE — CATH F6INF TL AL I 100CM: Brand: INFINITI

## (undated) DEVICE — Device

## (undated) DEVICE — RADIFOCUS OPTITORQUE ANGIOGRAPHIC CATHETER: Brand: OPTITORQUE

## (undated) DEVICE — ST ACC MICROPUNCTURE .018 ECHO/TRANSLSS/SS/TP 4F/10CM 21G

## (undated) DEVICE — DGW .035 FC J3MM 260CM TEF: Brand: EMERALD

## (undated) DEVICE — PINNACLE INTRODUCER SHEATH: Brand: PINNACLE

## (undated) DEVICE — GW INQW FIX/CORE PTFE J/3MM .035 260CM

## (undated) DEVICE — ANGIO-SEAL VIP VASCULAR CLOSURE DEVICE: Brand: ANGIO-SEAL

## (undated) DEVICE — GLIDESHEATH SLENDER STAINLESS STEEL KIT: Brand: GLIDESHEATH SLENDER

## (undated) DEVICE — CATH F6 ST JR 4 100CM: Brand: SUPERTORQUE

## (undated) DEVICE — CATH F6 ST JL 4 100CM: Brand: SUPERTORQUE

## (undated) DEVICE — TR BAND RADIAL ARTERY COMPRESSION DEVICE: Brand: TR BAND